# Patient Record
Sex: FEMALE | Race: WHITE | HISPANIC OR LATINO | Employment: UNEMPLOYED | URBAN - METROPOLITAN AREA
[De-identification: names, ages, dates, MRNs, and addresses within clinical notes are randomized per-mention and may not be internally consistent; named-entity substitution may affect disease eponyms.]

---

## 2019-06-05 ENCOUNTER — APPOINTMENT (OUTPATIENT)
Dept: RADIOLOGY | Facility: MEDICAL CENTER | Age: 54
End: 2019-06-05
Attending: EMERGENCY MEDICINE
Payer: COMMERCIAL

## 2019-06-05 ENCOUNTER — HOSPITAL ENCOUNTER (EMERGENCY)
Facility: MEDICAL CENTER | Age: 54
End: 2019-06-06
Attending: EMERGENCY MEDICINE
Payer: COMMERCIAL

## 2019-06-05 DIAGNOSIS — K52.9 COLITIS: ICD-10-CM

## 2019-06-05 DIAGNOSIS — R10.30 LOWER ABDOMINAL PAIN: ICD-10-CM

## 2019-06-05 DIAGNOSIS — R25.2 MUSCLE CRAMPS: ICD-10-CM

## 2019-06-05 LAB
ABO + RH BLD: NORMAL
ABO GROUP BLD: NORMAL
ALBUMIN SERPL BCP-MCNC: 3.8 G/DL (ref 3.2–4.9)
ALBUMIN/GLOB SERPL: 1 G/DL
ALP SERPL-CCNC: 78 U/L (ref 30–99)
ALT SERPL-CCNC: 14 U/L (ref 2–50)
ANION GAP SERPL CALC-SCNC: 8 MMOL/L (ref 0–11.9)
APPEARANCE UR: CLEAR
APTT PPP: 26.7 SEC (ref 24.7–36)
AST SERPL-CCNC: 18 U/L (ref 12–45)
BASOPHILS # BLD AUTO: 0.9 % (ref 0–1.8)
BASOPHILS # BLD: 0.06 K/UL (ref 0–0.12)
BILIRUB SERPL-MCNC: 0.6 MG/DL (ref 0.1–1.5)
BILIRUB UR QL STRIP.AUTO: NEGATIVE
BLD GP AB SCN SERPL QL: NORMAL
BUN SERPL-MCNC: 13 MG/DL (ref 8–22)
CALCIUM SERPL-MCNC: 9.2 MG/DL (ref 8.4–10.2)
CHLORIDE SERPL-SCNC: 102 MMOL/L (ref 96–112)
CO2 SERPL-SCNC: 28 MMOL/L (ref 20–33)
COLOR UR: YELLOW
CREAT SERPL-MCNC: 0.8 MG/DL (ref 0.5–1.4)
EOSINOPHIL # BLD AUTO: 0.22 K/UL (ref 0–0.51)
EOSINOPHIL NFR BLD: 3.3 % (ref 0–6.9)
ERYTHROCYTE [DISTWIDTH] IN BLOOD BY AUTOMATED COUNT: 39.2 FL (ref 35.9–50)
GLOBULIN SER CALC-MCNC: 3.8 G/DL (ref 1.9–3.5)
GLUCOSE SERPL-MCNC: 254 MG/DL (ref 65–99)
GLUCOSE UR STRIP.AUTO-MCNC: 100 MG/DL
HCT VFR BLD AUTO: 37.1 % (ref 37–47)
HGB BLD-MCNC: 12 G/DL (ref 12–16)
IMM GRANULOCYTES # BLD AUTO: 0.01 K/UL (ref 0–0.11)
IMM GRANULOCYTES NFR BLD AUTO: 0.1 % (ref 0–0.9)
INR PPP: 1 (ref 0.87–1.13)
KETONES UR STRIP.AUTO-MCNC: NEGATIVE MG/DL
LEUKOCYTE ESTERASE UR QL STRIP.AUTO: NEGATIVE
LIPASE SERPL-CCNC: 38 U/L (ref 7–58)
LYMPHOCYTES # BLD AUTO: 2.39 K/UL (ref 1–4.8)
LYMPHOCYTES NFR BLD: 35.7 % (ref 22–41)
MCH RBC QN AUTO: 27.8 PG (ref 27–33)
MCHC RBC AUTO-ENTMCNC: 32.3 G/DL (ref 33.6–35)
MCV RBC AUTO: 86.1 FL (ref 81.4–97.8)
MICRO URNS: ABNORMAL
MONOCYTES # BLD AUTO: 0.74 K/UL (ref 0–0.85)
MONOCYTES NFR BLD AUTO: 11 % (ref 0–13.4)
NEUTROPHILS # BLD AUTO: 3.28 K/UL (ref 2–7.15)
NEUTROPHILS NFR BLD: 49 % (ref 44–72)
NITRITE UR QL STRIP.AUTO: NEGATIVE
NRBC # BLD AUTO: 0 K/UL
NRBC BLD-RTO: 0 /100 WBC
PH UR STRIP.AUTO: 7.5 [PH]
PLATELET # BLD AUTO: 279 K/UL (ref 164–446)
PMV BLD AUTO: 10.5 FL (ref 9–12.9)
POTASSIUM SERPL-SCNC: 3.6 MMOL/L (ref 3.6–5.5)
PROT SERPL-MCNC: 7.6 G/DL (ref 6–8.2)
PROT UR QL STRIP: NEGATIVE MG/DL
PROTHROMBIN TIME: 13.3 SEC (ref 12–14.6)
RBC # BLD AUTO: 4.31 M/UL (ref 4.2–5.4)
RBC UR QL AUTO: NEGATIVE
RH BLD: NORMAL
SODIUM SERPL-SCNC: 138 MMOL/L (ref 135–145)
SP GR UR STRIP.AUTO: <=1.005
WBC # BLD AUTO: 6.7 K/UL (ref 4.8–10.8)

## 2019-06-05 PROCEDURE — 81003 URINALYSIS AUTO W/O SCOPE: CPT

## 2019-06-05 PROCEDURE — 700111 HCHG RX REV CODE 636 W/ 250 OVERRIDE (IP): Performed by: EMERGENCY MEDICINE

## 2019-06-05 PROCEDURE — 85730 THROMBOPLASTIN TIME PARTIAL: CPT

## 2019-06-05 PROCEDURE — 85610 PROTHROMBIN TIME: CPT

## 2019-06-05 PROCEDURE — 71045 X-RAY EXAM CHEST 1 VIEW: CPT

## 2019-06-05 PROCEDURE — 700102 HCHG RX REV CODE 250 W/ 637 OVERRIDE(OP): Performed by: EMERGENCY MEDICINE

## 2019-06-05 PROCEDURE — 83690 ASSAY OF LIPASE: CPT

## 2019-06-05 PROCEDURE — 36415 COLL VENOUS BLD VENIPUNCTURE: CPT

## 2019-06-05 PROCEDURE — 82272 OCCULT BLD FECES 1-3 TESTS: CPT

## 2019-06-05 PROCEDURE — 96374 THER/PROPH/DIAG INJ IV PUSH: CPT

## 2019-06-05 PROCEDURE — 86850 RBC ANTIBODY SCREEN: CPT

## 2019-06-05 PROCEDURE — A9270 NON-COVERED ITEM OR SERVICE: HCPCS | Performed by: EMERGENCY MEDICINE

## 2019-06-05 PROCEDURE — 99285 EMERGENCY DEPT VISIT HI MDM: CPT

## 2019-06-05 PROCEDURE — 80053 COMPREHEN METABOLIC PANEL: CPT

## 2019-06-05 PROCEDURE — 700105 HCHG RX REV CODE 258

## 2019-06-05 PROCEDURE — 86900 BLOOD TYPING SEROLOGIC ABO: CPT

## 2019-06-05 PROCEDURE — 86901 BLOOD TYPING SEROLOGIC RH(D): CPT

## 2019-06-05 PROCEDURE — 85025 COMPLETE CBC W/AUTO DIFF WBC: CPT

## 2019-06-05 RX ORDER — SODIUM CHLORIDE 9 MG/ML
1000 INJECTION, SOLUTION INTRAVENOUS ONCE
Status: DISCONTINUED | OUTPATIENT
Start: 2019-06-05 | End: 2019-06-06

## 2019-06-05 RX ORDER — ONDANSETRON 2 MG/ML
4 INJECTION INTRAMUSCULAR; INTRAVENOUS ONCE
Status: DISCONTINUED | OUTPATIENT
Start: 2019-06-05 | End: 2019-06-06 | Stop reason: HOSPADM

## 2019-06-05 RX ORDER — MORPHINE SULFATE 4 MG/ML
4 INJECTION, SOLUTION INTRAMUSCULAR; INTRAVENOUS ONCE
Status: DISCONTINUED | OUTPATIENT
Start: 2019-06-05 | End: 2019-06-06 | Stop reason: HOSPADM

## 2019-06-05 RX ORDER — SODIUM CHLORIDE 9 MG/ML
1000 INJECTION, SOLUTION INTRAVENOUS ONCE
Status: COMPLETED | OUTPATIENT
Start: 2019-06-05 | End: 2019-06-06

## 2019-06-05 RX ORDER — METRONIDAZOLE 500 MG/1
500 TABLET ORAL 3 TIMES DAILY
Status: SHIPPED | COMMUNITY
End: 2019-06-06

## 2019-06-05 RX ORDER — PANTOPRAZOLE SODIUM 40 MG/1
40 TABLET, DELAYED RELEASE ORAL DAILY
Status: SHIPPED | COMMUNITY
End: 2019-08-03

## 2019-06-05 RX ADMIN — FAMOTIDINE 20 MG: 10 INJECTION, SOLUTION INTRAVENOUS at 22:37

## 2019-06-05 RX ADMIN — SODIUM CHLORIDE 1000 ML: 9 INJECTION, SOLUTION INTRAVENOUS at 22:37

## 2019-06-06 VITALS
HEIGHT: 63 IN | HEART RATE: 68 BPM | TEMPERATURE: 98.9 F | SYSTOLIC BLOOD PRESSURE: 170 MMHG | WEIGHT: 164.9 LBS | OXYGEN SATURATION: 94 % | BODY MASS INDEX: 29.22 KG/M2 | RESPIRATION RATE: 18 BRPM | DIASTOLIC BLOOD PRESSURE: 92 MMHG

## 2019-06-06 LAB
ERYTHROCYTE [DISTWIDTH] IN BLOOD BY AUTOMATED COUNT: 39.5 FL (ref 35.9–50)
HCT VFR BLD AUTO: 34 % (ref 37–47)
HGB BLD-MCNC: 11.1 G/DL (ref 12–16)
MCH RBC QN AUTO: 28.5 PG (ref 27–33)
MCHC RBC AUTO-ENTMCNC: 32.6 G/DL (ref 33.6–35)
MCV RBC AUTO: 87.2 FL (ref 81.4–97.8)
PLATELET # BLD AUTO: 248 K/UL (ref 164–446)
PMV BLD AUTO: 10.7 FL (ref 9–12.9)
RBC # BLD AUTO: 3.9 M/UL (ref 4.2–5.4)
WBC # BLD AUTO: 6.3 K/UL (ref 4.8–10.8)

## 2019-06-06 PROCEDURE — 74177 CT ABD & PELVIS W/CONTRAST: CPT

## 2019-06-06 PROCEDURE — 700117 HCHG RX CONTRAST REV CODE 255: Performed by: EMERGENCY MEDICINE

## 2019-06-06 PROCEDURE — A9270 NON-COVERED ITEM OR SERVICE: HCPCS | Performed by: EMERGENCY MEDICINE

## 2019-06-06 PROCEDURE — 700102 HCHG RX REV CODE 250 W/ 637 OVERRIDE(OP): Performed by: EMERGENCY MEDICINE

## 2019-06-06 PROCEDURE — 85027 COMPLETE CBC AUTOMATED: CPT

## 2019-06-06 RX ORDER — METRONIDAZOLE 500 MG/1
500 TABLET ORAL ONCE
Status: COMPLETED | OUTPATIENT
Start: 2019-06-06 | End: 2019-06-06

## 2019-06-06 RX ORDER — CIPROFLOXACIN 500 MG/1
500 TABLET, FILM COATED ORAL ONCE
Status: COMPLETED | OUTPATIENT
Start: 2019-06-06 | End: 2019-06-06

## 2019-06-06 RX ORDER — METRONIDAZOLE 500 MG/1
500 TABLET ORAL 3 TIMES DAILY
Qty: 21 TAB | Refills: 0 | Status: SHIPPED | OUTPATIENT
Start: 2019-06-06 | End: 2019-06-13

## 2019-06-06 RX ORDER — CIPROFLOXACIN 500 MG/1
500 TABLET, FILM COATED ORAL 2 TIMES DAILY
Qty: 14 TAB | Refills: 0 | Status: SHIPPED | OUTPATIENT
Start: 2019-06-06 | End: 2019-06-13

## 2019-06-06 RX ADMIN — IOHEXOL 25 ML: 240 INJECTION, SOLUTION INTRATHECAL; INTRAVASCULAR; INTRAVENOUS; ORAL at 00:33

## 2019-06-06 RX ADMIN — IOHEXOL 100 ML: 350 INJECTION, SOLUTION INTRAVENOUS at 00:33

## 2019-06-06 RX ADMIN — METRONIDAZOLE 500 MG: 500 TABLET ORAL at 01:10

## 2019-06-06 RX ADMIN — CIPROFLOXACIN HYDROCHLORIDE 500 MG: 500 TABLET, FILM COATED ORAL at 01:10

## 2019-06-06 NOTE — ED PROVIDER NOTES
"ED Provider Note    CHIEF COMPLAINT  Chief Complaint   Patient presents with   • Bloody Stools     for several days    • Abdominal Pain     for several days       HPI  Patient is a 53-year-old female with no reported past medical history and no chronic medications who presents emergency room for evaluation of chronic abdominal pains.  She reports having intermittent loose stools and speckled amounts of red blood in the stools with associated abdominal pains for the better part of one month.  She notes that she was treated in clinic and was given 3 medications which include pantoprazole/clarithromycin/Flagyl but is unsure of the diagnosis or why she is taking them.  She completed these medicines but has continued to have 5-6 loose stools per day and is now developing muscle cramps, intermittent fatigue and malaise, and has had no change with her bloody bowel movements or loose stools.  She reports subjective fevers, chills but denies chest pain/shortness of breath/nausea/vomiting, headaches/dizziness.    Video  used for HPI/exam/consent for DAMON.    REVIEW OF SYSTEMS  See HPI for further details. All remaining 6 symptoms are negative    PAST MEDICAL HISTORY       SOCIAL HISTORY  Social History     Social History Main Topics   • Smoking status: Never Smoker   • Smokeless tobacco: Never Used   • Alcohol use No   • Drug use: No   • Sexual activity: Not on file       SURGICAL HISTORY  patient denies any surgical history    CURRENT MEDICATIONS  Home Medications     Reviewed by Flori Larkin R.N. (Registered Nurse) on 06/05/19 at 2112  Med List Status: Partial   Medication Last Dose Status   metroNIDAZOLE (FLAGYL) 500 MG Tab  Active   pantoprazole (PROTONIX) 40 MG Tablet Delayed Response  Active                ALLERGIES  No Known Allergies    PHYSICAL EXAM  VITAL SIGNS: BP (!) 170/92   Pulse 68   Temp 37.2 °C (98.9 °F) (Temporal)   Resp 18   Ht 1.6 m (5' 3\")   Wt 74.8 kg (164 lb 14.5 oz)   SpO2 94%   " BMI 29.21 kg/m²   Pulse ox interpretation: I interpret this pulse ox as normal.  Genl: F sitting in chair comfortably, speaking clearly, appears in mild distress   Head: NC/AT   ENT: Mucous membranes dry, posterior pharynx clear, uvula midline, nares patent bilaterally   Eyes: Normal sclera, pupils equal round reactive to light  Neck: Supple, FROM, no LAD appreciated   Pulmonary: Lungs are clear to auscultation bilaterally  Chest: No TTP  CV:  RRR, no murmur appreciated, pulses 2+ in both upper and lower extremities  Abdomen: obese and soft, Diffusely tender without localizing areas.  ND; no rebound/guarding, no masses palpated, no HSM  : no CVA tenderness.  Rectal: no fullness or signs of abscess or appreciable tenderness.  No gross blood on DAMON.  Guiac is weakly positive  Musculoskeletal: Pain free ROM of the neck. Moving upper and lower extremities and spontaneous in coordinated fashion  Neuro: A&Ox4 (person, place, time, situation), speech fluent, gait steady, no focal deficits appreciated, No cerebellar signs. Sensation is grossly intact in the distal upper and lower extremities.  5/5 strength in  and dorsiflexion/plantar flexion of the ankles  Psych: Patient has an appropriate affect and behavior  Skin: No rash or lesions.  No pallor or jaundice.  No cyanosis.  Warm and dry.     DIAGNOSTIC STUDIES / PROCEDURES    LABS  Labs Reviewed   CBC WITH DIFFERENTIAL - Abnormal; Notable for the following:        Result Value    MCHC 32.3 (*)     All other components within normal limits    Narrative:     Indicate which anticoagulants the patient is on:->NONE   COMP METABOLIC PANEL - Abnormal; Notable for the following:     Glucose 254 (*)     Globulin 3.8 (*)     All other components within normal limits    Narrative:     Indicate which anticoagulants the patient is on:->NONE   URINALYSIS,CULTURE IF INDICATED - Abnormal; Notable for the following:     Glucose 100 (*)     All other components within normal limits    CBC WITHOUT DIFFERENTIAL - Abnormal; Notable for the following:     RBC 3.90 (*)     Hemoglobin 11.1 (*)     Hematocrit 34.0 (*)     MCHC 32.6 (*)     All other components within normal limits   COD (ADULT)   LIPASE    Narrative:     Indicate which anticoagulants the patient is on:->NONE   PROTHROMBIN TIME    Narrative:     Indicate which anticoagulants the patient is on:->NONE   APTT    Narrative:     Indicate which anticoagulants the patient is on:->NONE   ABO RH CONFIRM   ESTIMATED GFR    Narrative:     Indicate which anticoagulants the patient is on:->NONE     RADIOLOGY  CT-ABDOMEN-PELVIS WITH   Final Result            1. Study degraded by motion artifact.   2. Rectosigmoid wall thickening suggesting infectious or inflammatory colitis.               DX-CHEST-PORTABLE (1 VIEW)   Final Result      No acute cardiopulmonary abnormality.          COURSE & MEDICAL DECISION MAKING  Pertinent Labs & Imaging studies reviewed. (See chart for details)    DDX:  Diveriticulitis  Medication side effect  Rectal fissure  Rectal mass  Malignancy  Ovarian torsion/pregancy are less likely  Cholecystitis  Appendicitis  Neprolithiasis  UTI/Cystitis  Gastroenteritis    MDM    Initial evaluation at [facility]:    Patient presented emergency room for the symptoms as described above.  She was having lower abdominal discomfort, continued loose stools and upper myalgias of the lower extremities.  On initial evaluation she had diffuse abdominal tenderness without signs of progression to peritonitis.  Based on the duration of her symptoms, associated bloody stools and frequent stooling, I was concerned mostly about diverticulitis versus colitis and other infectious etiology.  She was afebrile with reassuring vital signs but had clinical signs of dehydration on exam.  IV fluid bolus initiated and labs drawn for the differential noted above.  There is no leukocytosis, no leftward shift, slight anemia.  There is no LAURA or gross metabolic  abnormalities.  The patient is offered GI cocktail, nausea medicines and pain medications which she politely refused.  Review of her prior medications notes that this is likely a treatment for upper GI infection or possible H. pylori.  It does not sound that she had a full work-up for urease breath test or any other acute diagnostic interventions on prior work-up.  Based on her mostly lower abdominal discomfort CT abdomen pelvis is obtained.  Urinalysis is without acute infectious etiology and she has no diarrhea here in the emergency department.  Stool guaiac from DAMON is only weakly positive with no heidi blood.  CT of the abdomen and pelvis showed evidence of rectosigmoid colitis.  She is treated with Flagyl and ciprofloxacin.  Repeat exam shows no progression in her abdominal exam and no evidence of vital sign abnormalities or development of fevers.  With no other signs of acute surgical process patient will be started on a outpatient course of appropriate antibiotics for acute colitis.  Patient is given strict return precautions and understands that there may be associated diarrhea or other loose stools that are a side effect of these medications and she is at increased risk of colitis from C. difficile.  If she has worsening fevers, chills or abdominal distention she needs to return for reevaluation and stool culture.  She should follow-up with her outpatient provider upon discharge and completion of the antibiotics.  Questions are addressed using  services and she is discharged home in stable condition    HYDRATION: Based on the patient's presentation of Acute Diarrhea and Dehydration the patient was given IV fluids. IV Hydration was used because oral hydration was not adequate alone. Upon recheck following hydration, the patient was improved.    The patient will not drink alcohol nor drive with prescribed medications. The patient will return for worsening symptoms and is stable at the time of  discharge. The patient verbalizes understanding and will comply.      FINAL IMPRESSION  Visit Diagnoses     ICD-10-CM   1. Colitis K52.9   2. Lower abdominal pain R10.30   3. Muscle cramps R25.2      Electronically signed by: Corey Tidwell, 6/5/2019 10:04 PM

## 2019-06-06 NOTE — ED NOTES
Pt in bed with  at bedside, c/o diarrhea x 4 weeks and blood in stool for last 2 days. Pt c/o continued abdominal pain for past 4 weeks. IV placed and blood samples at lab. ERP just left bedside. Pt ambulating to bathroom to provide urine sample. Pt primarily Italian speaking language line  used.

## 2019-06-06 NOTE — ED NOTES
Pt medicated per MAR. Pr refused need for morphine and zofran. GI cocktail held at this time, pt provided contrast to drink for CT and instructed on how to drink.

## 2019-06-06 NOTE — ED TRIAGE NOTES
Pt BIB   C/o abdomen pain and diarrhea for over a month now  Diarrhea is bloody and abdomen is very painful  Was seen by Dr Leticia Thomas and ws started on med's  Just finished Biaxin and is still on flagyl  Feels not getting better having body aches all over

## 2019-06-06 NOTE — ED NOTES
ERP was in to discuss plan of care with pt and . Patient provided printed discharge instructions which included signs and symptoms to look out for, why to return to ER, and other follow up appointment to make. Patient provided prescriptions, information on medications, and how to . Patient stated they understand discharge instructions and had no further questions or concerns at this time. Patient discharged to home with . Patient ambulated out of ER with stable gait.

## 2019-08-03 ENCOUNTER — HOSPITAL ENCOUNTER (INPATIENT)
Facility: MEDICAL CENTER | Age: 54
LOS: 3 days | DRG: 386 | End: 2019-08-06
Attending: EMERGENCY MEDICINE | Admitting: HOSPITALIST
Payer: COMMERCIAL

## 2019-08-03 ENCOUNTER — APPOINTMENT (OUTPATIENT)
Dept: RADIOLOGY | Facility: MEDICAL CENTER | Age: 54
DRG: 386 | End: 2019-08-03
Attending: EMERGENCY MEDICINE
Payer: COMMERCIAL

## 2019-08-03 DIAGNOSIS — K51.00 ULCERATIVE PANCOLITIS WITHOUT COMPLICATION (HCC): ICD-10-CM

## 2019-08-03 PROBLEM — E87.6 HYPOKALEMIA: Status: ACTIVE | Noted: 2019-08-03

## 2019-08-03 PROBLEM — E87.1 HYPONATREMIA: Status: ACTIVE | Noted: 2019-08-03

## 2019-08-03 PROBLEM — R73.9 HYPERGLYCEMIA: Status: ACTIVE | Noted: 2019-08-03

## 2019-08-03 PROBLEM — K62.5 BRBPR (BRIGHT RED BLOOD PER RECTUM): Status: ACTIVE | Noted: 2019-08-03

## 2019-08-03 LAB
ALBUMIN SERPL BCP-MCNC: 2.6 G/DL (ref 3.2–4.9)
ALBUMIN/GLOB SERPL: 0.6 G/DL
ALP SERPL-CCNC: 78 U/L (ref 30–99)
ALT SERPL-CCNC: 27 U/L (ref 2–50)
ANION GAP SERPL CALC-SCNC: 9 MMOL/L (ref 0–11.9)
APPEARANCE UR: CLEAR
AST SERPL-CCNC: 24 U/L (ref 12–45)
BACTERIA #/AREA URNS HPF: ABNORMAL /HPF
BASOPHILS # BLD AUTO: 0.5 % (ref 0–1.8)
BASOPHILS # BLD: 0.05 K/UL (ref 0–0.12)
BILIRUB SERPL-MCNC: 0.4 MG/DL (ref 0.1–1.5)
BILIRUB UR QL STRIP.AUTO: NORMAL
BUN SERPL-MCNC: 7 MG/DL (ref 8–22)
CALCIUM SERPL-MCNC: 8.1 MG/DL (ref 8.4–10.2)
CHLORIDE SERPL-SCNC: 95 MMOL/L (ref 96–112)
CO2 SERPL-SCNC: 25 MMOL/L (ref 20–33)
COLOR UR: NORMAL
CREAT SERPL-MCNC: 0.62 MG/DL (ref 0.5–1.4)
EKG IMPRESSION: NORMAL
EOSINOPHIL # BLD AUTO: 0.1 K/UL (ref 0–0.51)
EOSINOPHIL NFR BLD: 1.1 % (ref 0–6.9)
EPI CELLS #/AREA URNS HPF: ABNORMAL /HPF
ERYTHROCYTE [DISTWIDTH] IN BLOOD BY AUTOMATED COUNT: 38.4 FL (ref 35.9–50)
GLOBULIN SER CALC-MCNC: 4.1 G/DL (ref 1.9–3.5)
GLUCOSE SERPL-MCNC: 267 MG/DL (ref 65–99)
GLUCOSE UR STRIP.AUTO-MCNC: NORMAL MG/DL
HCT VFR BLD AUTO: 32.4 % (ref 37–47)
HGB BLD-MCNC: 10.7 G/DL (ref 12–16)
IMM GRANULOCYTES # BLD AUTO: 0.05 K/UL (ref 0–0.11)
IMM GRANULOCYTES NFR BLD AUTO: 0.5 % (ref 0–0.9)
KETONES UR STRIP.AUTO-MCNC: NORMAL MG/DL
LACTATE BLD-SCNC: 1.3 MMOL/L (ref 0.5–2)
LACTATE BLD-SCNC: 1.4 MMOL/L (ref 0.5–2)
LEUKOCYTE ESTERASE UR QL STRIP.AUTO: NEGATIVE
LIPASE SERPL-CCNC: 27 U/L (ref 7–58)
LYMPHOCYTES # BLD AUTO: 1.22 K/UL (ref 1–4.8)
LYMPHOCYTES NFR BLD: 12.9 % (ref 22–41)
MCH RBC QN AUTO: 27.2 PG (ref 27–33)
MCHC RBC AUTO-ENTMCNC: 33 G/DL (ref 33.6–35)
MCV RBC AUTO: 82.2 FL (ref 81.4–97.8)
MICRO URNS: NORMAL
MONOCYTES # BLD AUTO: 0.64 K/UL (ref 0–0.85)
MONOCYTES NFR BLD AUTO: 6.8 % (ref 0–13.4)
MUCOUS THREADS #/AREA URNS HPF: ABNORMAL /HPF
NEUTROPHILS # BLD AUTO: 7.42 K/UL (ref 2–7.15)
NEUTROPHILS NFR BLD: 78.2 % (ref 44–72)
NITRITE UR QL STRIP.AUTO: NEGATIVE
NRBC # BLD AUTO: 0 K/UL
NRBC BLD-RTO: 0 /100 WBC
PH UR STRIP.AUTO: 6.5 [PH] (ref 5–8)
PLATELET # BLD AUTO: 310 K/UL (ref 164–446)
PMV BLD AUTO: 9.5 FL (ref 9–12.9)
POTASSIUM SERPL-SCNC: 3.3 MMOL/L (ref 3.6–5.5)
PROT SERPL-MCNC: 6.7 G/DL (ref 6–8.2)
PROT UR QL STRIP: NORMAL MG/DL
RBC # BLD AUTO: 3.94 M/UL (ref 4.2–5.4)
RBC # URNS HPF: ABNORMAL /HPF
RBC UR QL AUTO: NEGATIVE
SODIUM SERPL-SCNC: 129 MMOL/L (ref 135–145)
SP GR UR STRIP.AUTO: 1.02
TROPONIN T SERPL-MCNC: 28 NG/L (ref 6–19)
WBC # BLD AUTO: 9.5 K/UL (ref 4.8–10.8)
WBC #/AREA URNS HPF: ABNORMAL /HPF

## 2019-08-03 PROCEDURE — 71045 X-RAY EXAM CHEST 1 VIEW: CPT

## 2019-08-03 PROCEDURE — 94760 N-INVAS EAR/PLS OXIMETRY 1: CPT

## 2019-08-03 PROCEDURE — 87040 BLOOD CULTURE FOR BACTERIA: CPT

## 2019-08-03 PROCEDURE — 83690 ASSAY OF LIPASE: CPT

## 2019-08-03 PROCEDURE — 84484 ASSAY OF TROPONIN QUANT: CPT

## 2019-08-03 PROCEDURE — 700111 HCHG RX REV CODE 636 W/ 250 OVERRIDE (IP): Performed by: EMERGENCY MEDICINE

## 2019-08-03 PROCEDURE — 85025 COMPLETE CBC W/AUTO DIFF WBC: CPT

## 2019-08-03 PROCEDURE — 80053 COMPREHEN METABOLIC PANEL: CPT

## 2019-08-03 PROCEDURE — 770020 HCHG ROOM/CARE - TELE (206)

## 2019-08-03 PROCEDURE — 700105 HCHG RX REV CODE 258: Performed by: HOSPITALIST

## 2019-08-03 PROCEDURE — 96375 TX/PRO/DX INJ NEW DRUG ADDON: CPT

## 2019-08-03 PROCEDURE — 700102 HCHG RX REV CODE 250 W/ 637 OVERRIDE(OP)

## 2019-08-03 PROCEDURE — 99222 1ST HOSP IP/OBS MODERATE 55: CPT | Performed by: HOSPITALIST

## 2019-08-03 PROCEDURE — 700111 HCHG RX REV CODE 636 W/ 250 OVERRIDE (IP): Performed by: HOSPITALIST

## 2019-08-03 PROCEDURE — A9270 NON-COVERED ITEM OR SERVICE: HCPCS | Performed by: FAMILY MEDICINE

## 2019-08-03 PROCEDURE — 93005 ELECTROCARDIOGRAM TRACING: CPT | Performed by: EMERGENCY MEDICINE

## 2019-08-03 PROCEDURE — 99285 EMERGENCY DEPT VISIT HI MDM: CPT

## 2019-08-03 PROCEDURE — A9270 NON-COVERED ITEM OR SERVICE: HCPCS

## 2019-08-03 PROCEDURE — 96374 THER/PROPH/DIAG INJ IV PUSH: CPT

## 2019-08-03 PROCEDURE — 83605 ASSAY OF LACTIC ACID: CPT

## 2019-08-03 PROCEDURE — 700101 HCHG RX REV CODE 250: Performed by: HOSPITALIST

## 2019-08-03 PROCEDURE — 700101 HCHG RX REV CODE 250: Performed by: FAMILY MEDICINE

## 2019-08-03 PROCEDURE — 81001 URINALYSIS AUTO W/SCOPE: CPT

## 2019-08-03 PROCEDURE — 700102 HCHG RX REV CODE 250 W/ 637 OVERRIDE(OP): Performed by: FAMILY MEDICINE

## 2019-08-03 PROCEDURE — 700105 HCHG RX REV CODE 258: Performed by: EMERGENCY MEDICINE

## 2019-08-03 PROCEDURE — 36415 COLL VENOUS BLD VENIPUNCTURE: CPT

## 2019-08-03 RX ORDER — SULFASALAZINE 500 MG/1
1000 TABLET ORAL 3 TIMES DAILY
Status: DISCONTINUED | OUTPATIENT
Start: 2019-08-03 | End: 2019-08-06 | Stop reason: HOSPADM

## 2019-08-03 RX ORDER — SULFASALAZINE 500 MG/1
1000 TABLET ORAL 3 TIMES DAILY
COMMUNITY

## 2019-08-03 RX ORDER — ACETAMINOPHEN 500 MG
1000 TABLET ORAL EVERY 8 HOURS PRN
COMMUNITY

## 2019-08-03 RX ORDER — AMOXICILLIN 250 MG
2 CAPSULE ORAL 2 TIMES DAILY
Status: DISCONTINUED | OUTPATIENT
Start: 2019-08-03 | End: 2019-08-03 | Stop reason: SINTOL

## 2019-08-03 RX ORDER — MORPHINE SULFATE 4 MG/ML
4 INJECTION, SOLUTION INTRAMUSCULAR; INTRAVENOUS ONCE
Status: COMPLETED | OUTPATIENT
Start: 2019-08-03 | End: 2019-08-03

## 2019-08-03 RX ORDER — PREDNISONE 10 MG/1
5-40 TABLET ORAL SEE ADMIN INSTRUCTIONS
COMMUNITY
Start: 2019-07-31

## 2019-08-03 RX ORDER — ONDANSETRON 2 MG/ML
4 INJECTION INTRAMUSCULAR; INTRAVENOUS ONCE
Status: COMPLETED | OUTPATIENT
Start: 2019-08-03 | End: 2019-08-03

## 2019-08-03 RX ORDER — LIDOCAINE 50 MG/G
1 PATCH TOPICAL
Status: DISCONTINUED | OUTPATIENT
Start: 2019-08-03 | End: 2019-08-06 | Stop reason: HOSPADM

## 2019-08-03 RX ORDER — TRAMADOL HYDROCHLORIDE 50 MG/1
50 TABLET ORAL EVERY 6 HOURS PRN
Status: DISCONTINUED | OUTPATIENT
Start: 2019-08-03 | End: 2019-08-06 | Stop reason: HOSPADM

## 2019-08-03 RX ORDER — SODIUM CHLORIDE 9 MG/ML
500 INJECTION, SOLUTION INTRAVENOUS
Status: DISCONTINUED | OUTPATIENT
Start: 2019-08-03 | End: 2019-08-06 | Stop reason: HOSPADM

## 2019-08-03 RX ORDER — SULFASALAZINE 500 MG/1
TABLET ORAL
Status: COMPLETED
Start: 2019-08-03 | End: 2019-08-03

## 2019-08-03 RX ORDER — BISACODYL 10 MG
10 SUPPOSITORY, RECTAL RECTAL
Status: DISCONTINUED | OUTPATIENT
Start: 2019-08-03 | End: 2019-08-03 | Stop reason: SINTOL

## 2019-08-03 RX ORDER — POLYETHYLENE GLYCOL 3350 17 G/17G
1 POWDER, FOR SOLUTION ORAL
Status: DISCONTINUED | OUTPATIENT
Start: 2019-08-03 | End: 2019-08-03 | Stop reason: SINTOL

## 2019-08-03 RX ORDER — SODIUM CHLORIDE 9 MG/ML
1000 INJECTION, SOLUTION INTRAVENOUS ONCE
Status: COMPLETED | OUTPATIENT
Start: 2019-08-03 | End: 2019-08-03

## 2019-08-03 RX ORDER — SODIUM CHLORIDE 9 MG/ML
30 INJECTION, SOLUTION INTRAVENOUS
Status: DISCONTINUED | OUTPATIENT
Start: 2019-08-03 | End: 2019-08-06 | Stop reason: HOSPADM

## 2019-08-03 RX ORDER — SODIUM CHLORIDE 9 MG/ML
INJECTION, SOLUTION INTRAVENOUS CONTINUOUS
Status: DISCONTINUED | OUTPATIENT
Start: 2019-08-03 | End: 2019-08-06 | Stop reason: HOSPADM

## 2019-08-03 RX ORDER — METHYLPREDNISOLONE SODIUM SUCCINATE 125 MG/2ML
62.5 INJECTION, POWDER, LYOPHILIZED, FOR SOLUTION INTRAMUSCULAR; INTRAVENOUS EVERY 6 HOURS
Status: DISCONTINUED | OUTPATIENT
Start: 2019-08-03 | End: 2019-08-05

## 2019-08-03 RX ADMIN — SULFASALAZINE 1000 MG: 500 TABLET ORAL at 22:02

## 2019-08-03 RX ADMIN — MORPHINE SULFATE 4 MG: 4 INJECTION INTRAVENOUS at 16:55

## 2019-08-03 RX ADMIN — LIDOCAINE 1 PATCH: 50 PATCH TOPICAL at 22:51

## 2019-08-03 RX ADMIN — ONDANSETRON 4 MG: 2 INJECTION INTRAMUSCULAR; INTRAVENOUS at 16:55

## 2019-08-03 RX ADMIN — TRAMADOL HYDROCHLORIDE 50 MG: 50 TABLET, FILM COATED ORAL at 22:51

## 2019-08-03 RX ADMIN — METHYLPREDNISOLONE SODIUM SUCCINATE 62.5 MG: 125 INJECTION, POWDER, FOR SOLUTION INTRAMUSCULAR; INTRAVENOUS at 22:02

## 2019-08-03 RX ADMIN — METRONIDAZOLE 500 MG: 500 INJECTION, SOLUTION INTRAVENOUS at 22:51

## 2019-08-03 RX ADMIN — CEFTRIAXONE SODIUM 2 G: 2 INJECTION, POWDER, FOR SOLUTION INTRAMUSCULAR; INTRAVENOUS at 22:03

## 2019-08-03 RX ADMIN — SODIUM CHLORIDE 1000 ML: 9 INJECTION, SOLUTION INTRAVENOUS at 18:20

## 2019-08-03 RX ADMIN — SODIUM CHLORIDE: 9 INJECTION, SOLUTION INTRAVENOUS at 22:03

## 2019-08-03 SDOH — HEALTH STABILITY: MENTAL HEALTH: HOW OFTEN DO YOU HAVE 6 OR MORE DRINKS ON ONE OCCASION?: NEVER

## 2019-08-03 SDOH — HEALTH STABILITY: MENTAL HEALTH: HOW MANY STANDARD DRINKS CONTAINING ALCOHOL DO YOU HAVE ON A TYPICAL DAY?: PATIENT DECLINED

## 2019-08-03 SDOH — HEALTH STABILITY: MENTAL HEALTH: HOW OFTEN DO YOU HAVE A DRINK CONTAINING ALCOHOL?: NEVER

## 2019-08-03 ASSESSMENT — COGNITIVE AND FUNCTIONAL STATUS - GENERAL
MOBILITY SCORE: 22
STANDING UP FROM CHAIR USING ARMS: A LITTLE
SUGGESTED CMS G CODE MODIFIER MOBILITY: CJ
DAILY ACTIVITIY SCORE: 24
SUGGESTED CMS G CODE MODIFIER DAILY ACTIVITY: CH
WALKING IN HOSPITAL ROOM: A LITTLE

## 2019-08-03 ASSESSMENT — LIFESTYLE VARIABLES
EVER FELT BAD OR GUILTY ABOUT YOUR DRINKING: NO
TOTAL SCORE: 0
CONSUMPTION TOTAL: NEGATIVE
HAVE YOU EVER FELT YOU SHOULD CUT DOWN ON YOUR DRINKING: NO
HOW MANY TIMES IN THE PAST YEAR HAVE YOU HAD 5 OR MORE DRINKS IN A DAY: 0
EVER_SMOKED: NEVER
ALCOHOL_USE: NO
TOTAL SCORE: 0
AVERAGE NUMBER OF DAYS PER WEEK YOU HAVE A DRINK CONTAINING ALCOHOL: 0
ON A TYPICAL DAY WHEN YOU DRINK ALCOHOL HOW MANY DRINKS DO YOU HAVE: 0
HAVE PEOPLE ANNOYED YOU BY CRITICIZING YOUR DRINKING: NO
EVER HAD A DRINK FIRST THING IN THE MORNING TO STEADY YOUR NERVES TO GET RID OF A HANGOVER: NO
TOTAL SCORE: 0

## 2019-08-03 ASSESSMENT — ENCOUNTER SYMPTOMS
HEARTBURN: 0
WEAKNESS: 1
HEADACHES: 0
NECK PAIN: 0
SORE THROAT: 0
COUGH: 0
SENSORY CHANGE: 0
PHOTOPHOBIA: 0
DIARRHEA: 1
SPEECH CHANGE: 0
BACK PAIN: 0
ABDOMINAL PAIN: 1
STRIDOR: 0
CHILLS: 0
MYALGIAS: 0
FEVER: 1
DEPRESSION: 0
NERVOUS/ANXIOUS: 0
PND: 0
NAUSEA: 1
HEMOPTYSIS: 0
TINGLING: 0
DOUBLE VISION: 0
CLAUDICATION: 0
DIZZINESS: 0
PALPITATIONS: 0
EYE PAIN: 0
SPUTUM PRODUCTION: 0
CONSTIPATION: 0
WEIGHT LOSS: 1
BLOOD IN STOOL: 1
VOMITING: 1
TREMORS: 0
SHORTNESS OF BREATH: 0
ORTHOPNEA: 0
MEMORY LOSS: 0
BLURRED VISION: 0

## 2019-08-03 ASSESSMENT — PATIENT HEALTH QUESTIONNAIRE - PHQ9
SUM OF ALL RESPONSES TO PHQ9 QUESTIONS 1 AND 2: 0
1. LITTLE INTEREST OR PLEASURE IN DOING THINGS: NOT AT ALL
2. FEELING DOWN, DEPRESSED, IRRITABLE, OR HOPELESS: NOT AT ALL

## 2019-08-03 NOTE — ED NOTES
Med Rec completed per patient's family and RX bottles (returned)   Allergies reviewed  No ORAL antibiotics in last 14 days    Pt started a tapering course of Prednisone 7/31/19

## 2019-08-03 NOTE — ED TRIAGE NOTES
"Chief Complaint   Patient presents with   • Weakness     diarrhea for past 4 months - pt sees specialist for issue, family states that pt has not gotten out of be since yesterday or eaten.      /54   Pulse 88   Temp 37.8 °C (100.1 °F) (Temporal)   Resp 18   Ht 1.626 m (5' 4\")   Wt 68 kg (150 lb)   SpO2 97%   BMI 25.75 kg/m²     Pt BIB family for above concerns, pt family states she is also nauseous. Pt given w/c in triage.   "

## 2019-08-04 PROBLEM — K51.90 ULCERATIVE COLITIS, ACUTE (HCC): Status: ACTIVE | Noted: 2019-08-03

## 2019-08-04 LAB
ALBUMIN SERPL BCP-MCNC: 2.3 G/DL (ref 3.2–4.9)
ALBUMIN/GLOB SERPL: 0.6 G/DL
ALP SERPL-CCNC: 72 U/L (ref 30–99)
ALT SERPL-CCNC: 27 U/L (ref 2–50)
ANION GAP SERPL CALC-SCNC: 8 MMOL/L (ref 0–11.9)
AST SERPL-CCNC: 20 U/L (ref 12–45)
BASOPHILS # BLD AUTO: 0.2 % (ref 0–1.8)
BASOPHILS # BLD: 0.02 K/UL (ref 0–0.12)
BILIRUB SERPL-MCNC: 0.6 MG/DL (ref 0.1–1.5)
BUN SERPL-MCNC: <5 MG/DL (ref 8–22)
CALCIUM SERPL-MCNC: 8 MG/DL (ref 8.4–10.2)
CHLORIDE SERPL-SCNC: 100 MMOL/L (ref 96–112)
CO2 SERPL-SCNC: 24 MMOL/L (ref 20–33)
CREAT SERPL-MCNC: 0.56 MG/DL (ref 0.5–1.4)
EOSINOPHIL # BLD AUTO: 0.02 K/UL (ref 0–0.51)
EOSINOPHIL NFR BLD: 0.2 % (ref 0–6.9)
ERYTHROCYTE [DISTWIDTH] IN BLOOD BY AUTOMATED COUNT: 39.6 FL (ref 35.9–50)
GLOBULIN SER CALC-MCNC: 3.8 G/DL (ref 1.9–3.5)
GLUCOSE BLD-MCNC: 255 MG/DL (ref 65–99)
GLUCOSE SERPL-MCNC: 270 MG/DL (ref 65–99)
HCT VFR BLD AUTO: 30.7 % (ref 37–47)
HGB BLD-MCNC: 9.9 G/DL (ref 12–16)
IMM GRANULOCYTES # BLD AUTO: 0.06 K/UL (ref 0–0.11)
IMM GRANULOCYTES NFR BLD AUTO: 0.7 % (ref 0–0.9)
LACTATE BLD-SCNC: 0.8 MMOL/L (ref 0.5–2)
LACTATE BLD-SCNC: 1 MMOL/L (ref 0.5–2)
LYMPHOCYTES # BLD AUTO: 0.89 K/UL (ref 1–4.8)
LYMPHOCYTES NFR BLD: 10.8 % (ref 22–41)
MCH RBC QN AUTO: 27.5 PG (ref 27–33)
MCHC RBC AUTO-ENTMCNC: 32.2 G/DL (ref 33.6–35)
MCV RBC AUTO: 85.3 FL (ref 81.4–97.8)
MONOCYTES # BLD AUTO: 0.18 K/UL (ref 0–0.85)
MONOCYTES NFR BLD AUTO: 2.2 % (ref 0–13.4)
NEUTROPHILS # BLD AUTO: 7.07 K/UL (ref 2–7.15)
NEUTROPHILS NFR BLD: 85.9 % (ref 44–72)
NRBC # BLD AUTO: 0 K/UL
NRBC BLD-RTO: 0 /100 WBC
PLATELET # BLD AUTO: 291 K/UL (ref 164–446)
PMV BLD AUTO: 9.8 FL (ref 9–12.9)
POTASSIUM SERPL-SCNC: 3.7 MMOL/L (ref 3.6–5.5)
PROT SERPL-MCNC: 6.1 G/DL (ref 6–8.2)
RBC # BLD AUTO: 3.6 M/UL (ref 4.2–5.4)
SODIUM SERPL-SCNC: 132 MMOL/L (ref 135–145)
WBC # BLD AUTO: 8.2 K/UL (ref 4.8–10.8)

## 2019-08-04 PROCEDURE — 80053 COMPREHEN METABOLIC PANEL: CPT

## 2019-08-04 PROCEDURE — 700102 HCHG RX REV CODE 250 W/ 637 OVERRIDE(OP): Performed by: HOSPITALIST

## 2019-08-04 PROCEDURE — 85025 COMPLETE CBC W/AUTO DIFF WBC: CPT

## 2019-08-04 PROCEDURE — 700101 HCHG RX REV CODE 250: Performed by: HOSPITALIST

## 2019-08-04 PROCEDURE — 99233 SBSQ HOSP IP/OBS HIGH 50: CPT | Performed by: INTERNAL MEDICINE

## 2019-08-04 PROCEDURE — 700102 HCHG RX REV CODE 250 W/ 637 OVERRIDE(OP): Performed by: INTERNAL MEDICINE

## 2019-08-04 PROCEDURE — 83605 ASSAY OF LACTIC ACID: CPT

## 2019-08-04 PROCEDURE — A9270 NON-COVERED ITEM OR SERVICE: HCPCS | Performed by: HOSPITALIST

## 2019-08-04 PROCEDURE — 770020 HCHG ROOM/CARE - TELE (206)

## 2019-08-04 PROCEDURE — 82962 GLUCOSE BLOOD TEST: CPT

## 2019-08-04 PROCEDURE — 700111 HCHG RX REV CODE 636 W/ 250 OVERRIDE (IP): Performed by: HOSPITALIST

## 2019-08-04 PROCEDURE — 700101 HCHG RX REV CODE 250: Performed by: FAMILY MEDICINE

## 2019-08-04 PROCEDURE — 83036 HEMOGLOBIN GLYCOSYLATED A1C: CPT

## 2019-08-04 PROCEDURE — 700105 HCHG RX REV CODE 258: Performed by: HOSPITALIST

## 2019-08-04 RX ORDER — INSULIN GLARGINE 100 [IU]/ML
5 INJECTION, SOLUTION SUBCUTANEOUS
Status: DISCONTINUED | OUTPATIENT
Start: 2019-08-05 | End: 2019-08-05

## 2019-08-04 RX ORDER — INSULIN GLARGINE 100 [IU]/ML
5 INJECTION, SOLUTION SUBCUTANEOUS
Status: DISCONTINUED | OUTPATIENT
Start: 2019-08-04 | End: 2019-08-04

## 2019-08-04 RX ADMIN — SULFASALAZINE 1000 MG: 500 TABLET ORAL at 18:03

## 2019-08-04 RX ADMIN — METHYLPREDNISOLONE SODIUM SUCCINATE 62.5 MG: 125 INJECTION, POWDER, FOR SOLUTION INTRAMUSCULAR; INTRAVENOUS at 05:45

## 2019-08-04 RX ADMIN — SODIUM CHLORIDE: 9 INJECTION, SOLUTION INTRAVENOUS at 20:26

## 2019-08-04 RX ADMIN — CEFTRIAXONE SODIUM 2 G: 2 INJECTION, POWDER, FOR SOLUTION INTRAMUSCULAR; INTRAVENOUS at 18:07

## 2019-08-04 RX ADMIN — INSULIN HUMAN 3 UNITS: 100 INJECTION, SOLUTION PARENTERAL at 18:16

## 2019-08-04 RX ADMIN — LIDOCAINE 1 PATCH: 50 PATCH TOPICAL at 20:30

## 2019-08-04 RX ADMIN — METRONIDAZOLE 500 MG: 500 INJECTION, SOLUTION INTRAVENOUS at 05:45

## 2019-08-04 RX ADMIN — METRONIDAZOLE 500 MG: 500 INJECTION, SOLUTION INTRAVENOUS at 22:10

## 2019-08-04 RX ADMIN — METHYLPREDNISOLONE SODIUM SUCCINATE 62.5 MG: 125 INJECTION, POWDER, FOR SOLUTION INTRAMUSCULAR; INTRAVENOUS at 11:25

## 2019-08-04 RX ADMIN — SULFASALAZINE 1000 MG: 500 TABLET ORAL at 08:15

## 2019-08-04 RX ADMIN — SULFASALAZINE 1000 MG: 500 TABLET ORAL at 11:24

## 2019-08-04 RX ADMIN — METHYLPREDNISOLONE SODIUM SUCCINATE 62.5 MG: 125 INJECTION, POWDER, FOR SOLUTION INTRAMUSCULAR; INTRAVENOUS at 18:04

## 2019-08-04 RX ADMIN — SODIUM CHLORIDE: 9 INJECTION, SOLUTION INTRAVENOUS at 05:46

## 2019-08-04 RX ADMIN — METRONIDAZOLE 500 MG: 500 INJECTION, SOLUTION INTRAVENOUS at 13:24

## 2019-08-04 ASSESSMENT — ENCOUNTER SYMPTOMS
BACK PAIN: 0
SORE THROAT: 0
WEAKNESS: 0
NAUSEA: 0
DIARRHEA: 1
VOMITING: 0
MYALGIAS: 0
PALPITATIONS: 0
BLOOD IN STOOL: 1
HALLUCINATIONS: 0
SHORTNESS OF BREATH: 0
DIZZINESS: 0
CHILLS: 0
ABDOMINAL PAIN: 1
FOCAL WEAKNESS: 0
COUGH: 0
FEVER: 0
HEARTBURN: 0
DEPRESSION: 0
HEADACHES: 0

## 2019-08-04 NOTE — PROGRESS NOTES
Telemetry Shift Summary    Rhythm SR  HR Range 83-99  Ectopy none  Measurements .16/.08/.32          Normal Values  Rhythm SR  HR Range    Measurements 0.12-0.20 / 0.06-0.10  / 0.30-0.52

## 2019-08-04 NOTE — CARE PLAN
Pt is aware of POC. Progressing as expected in the following areas  Problem: Safety  Goal: Will remain free from injury  Outcome: PROGRESSING AS EXPECTED  Goal: Will remain free from falls  Outcome: PROGRESSING AS EXPECTED     Problem: Infection  Goal: Will remain free from infection  Outcome: PROGRESSING AS EXPECTED     Problem: Pain Management  Goal: Pain level will decrease to patient's comfort goal  Outcome: PROGRESSING AS EXPECTED     Problem: Psychosocial Needs:  Goal: Level of anxiety will decrease  Outcome: PROGRESSING AS EXPECTED

## 2019-08-04 NOTE — ASSESSMENT & PLAN NOTE
Was placed on Sulfasalazine by GI consultants recently, patient was also placed on flagyl recently, however still having diarrhea/pain/blood in stool  Continue IV Ceftriaxone/Metronidazole to prevent secondary infectious colitis  Continue IV solumedrol, she has had improvement but still w cramping/diarrhea  CDIFF negative  GI consulted, per ERP, they said hydration, IV solumedrol and if she does not respond to contact them for consult  She has improved so no inpatient consult needed at this time

## 2019-08-04 NOTE — PROGRESS NOTES
Pt sitting up in bed eating lunch. Pt denies any pain at this time. Pt stated all needs were met. Bed low/locked position, call light within reach, will continue to monitor.

## 2019-08-04 NOTE — PROGRESS NOTES
2 RN skin check complete with NAWAF Bernardo.  Devices in place none.  Skin assessed under devices n/a.  Confirmed pressure ulcer found none.  New potential pressure ulcers noted on none.  Wound consult placed and reported n/a.  The following interventions in place pt encouraged on frequent turns and early ambulation.

## 2019-08-04 NOTE — H&P
Hospital Medicine History & Physical Note    Date of Service  8/3/2019    Primary Care Physician  No primary care provider on file.    Consultants  GI Consultants    Code Status  Full Code    Chief Complaint  Chief Complaint   Patient presents with   • Weakness     diarrhea for past 4 months - pt sees specialist for issue, family states that pt has not gotten out of be since yesterday or eaten.        History of Presenting Illness  Wili York is a very pleasant 53 y.o. female with a past medical history of recently diagnosed Ulcerative colitis, was recently seen by Dr. Piper, underwent colonoscopy and was placed on oral Flagyl as well as prednisone taper, however patient presented to the emergency room on 8/3/2019 evaluation of continued diarrhea, malaise, and intractable nausea vomiting.  Patient says that she is tired and has been feeling this way for the past several weeks.  Patient says she has lost weight over the past several pounds.  She reports having blood in her diarrhea.  Unable to keep anything oral down including her antibiotics.      Review of Systems  Review of Systems   Constitutional: Positive for fever, malaise/fatigue and weight loss. Negative for chills.   HENT: Negative for congestion, hearing loss, sore throat and tinnitus.    Eyes: Negative for blurred vision, double vision, photophobia and pain.   Respiratory: Negative for cough, hemoptysis, sputum production, shortness of breath and stridor.    Cardiovascular: Negative for chest pain, palpitations, orthopnea, claudication and PND.   Gastrointestinal: Positive for abdominal pain, blood in stool, diarrhea, nausea and vomiting. Negative for constipation, heartburn and melena.   Genitourinary: Negative for dysuria, frequency and urgency.   Musculoskeletal: Negative for back pain, myalgias and neck pain.   Neurological: Positive for weakness. Negative for dizziness, tingling, tremors, sensory change, speech change and headaches.    Psychiatric/Behavioral: Negative for depression, memory loss and suicidal ideas. The patient is not nervous/anxious.    All other systems reviewed and are negative.      Past Medical History  Ulcerative Colitis     Surgical History  Patient denies prior surgeries    Family History  Denies significant past family history.    Social History   reports that she has never smoked. She has never used smokeless tobacco. She reports that she does not drink alcohol or use drugs.    Allergies  No Known Allergies    Medications  Prior to Admission medications    Medication Sig Start Date End Date Taking? Authorizing Provider   acetaminophen (TYLENOL) 500 MG Tab Take 1,000 mg by mouth every 8 hours as needed.   Yes Physician Outpatient   sulfaSALAzine (AZULFIDINE) 500 MG Tab Take 1,000 mg by mouth 3 times a day.   Yes Physician Outpatient   predniSONE (DELTASONE) 10 MG Tab Take 5-40 mg by mouth See Admin Instructions. 40 mg for 14 days  30 mg for 14 days  20 mg for 14 days  10 mg for 14 days  5 mg for 14 days  Then STOP 7/31/19  Yes Physician Outpatient       Physical Exam  Temp:  [37.8 °C (100.1 °F)] 37.8 °C (100.1 °F)  Pulse:  [86-89] 88  Resp:  [16-18] 16  BP: ()/(51-55) 95/55  SpO2:  [92 %-97 %] 94 %  Physical Exam   Constitutional: She is oriented to person, place, and time. She appears well-developed and well-nourished. No distress.   Ill appearing   HENT:   Head: Normocephalic and atraumatic.   Mouth/Throat: No oropharyngeal exudate.   Mucous membranes dry   Eyes: Pupils are equal, round, and reactive to light. Conjunctivae are normal. Right eye exhibits no discharge. No scleral icterus.   Neck: Neck supple. No JVD present. No thyromegaly present.   Cardiovascular: Intact distal pulses.   No murmur heard.  Pulmonary/Chest: Effort normal and breath sounds normal. No stridor. No respiratory distress. She has no wheezes. She has no rales.   Abdominal: Soft. Bowel sounds are normal. She exhibits no distension. There  is no tenderness. There is no rebound.   Musculoskeletal: Normal range of motion. She exhibits no edema.   Neurological: She is alert and oriented to person, place, and time. No cranial nerve deficit.   Skin: Skin is warm. She is not diaphoretic. No erythema.   Psychiatric: Thought content normal.   anxious   Nursing note and vitals reviewed.      Laboratory:  Recent Labs     08/03/19  1650   WBC 9.5   RBC 3.94*   HEMOGLOBIN 10.7*   HEMATOCRIT 32.4*   MCV 82.2   MCH 27.2   MCHC 33.0*   RDW 38.4   PLATELETCT 310   MPV 9.5     Recent Labs     08/03/19  1650   SODIUM 129*   POTASSIUM 3.3*   CHLORIDE 95*   CO2 25   GLUCOSE 267*   BUN 7*   CREATININE 0.62   CALCIUM 8.1*     Recent Labs     08/03/19  1650   ALTSGPT 27   ASTSGOT 24   ALKPHOSPHAT 78   TBILIRUBIN 0.4   LIPASE 27   GLUCOSE 267*               Urinalysis:          Imaging:  DX-CHEST-LIMITED (1 VIEW)   Final Result      1.  Minimal linear atelectasis in the left lower lobe.      2.  Otherwise clear chest.          Assessment/Plan:  I anticipate this patient will require at least two midnights for appropriate medical management, necessitating inpatient admission.    * BRBPR (bright red blood per rectum)  Assessment & Plan  Hx of Ulcerative Colitis, was placed on Sulfasalazine by GI consultants recently, patient was also placed on flagyl recently, however still having diarrhea, abdominal pain for over a week.  IV Ceftriaxone/Metronidazole ordered  IV solumedrol started.  CDIFF pending.  GI consulted, per ERP, they said hydrate her, IV solumedrol if she does not respond to contact them for consult     Hyperglycemia  Assessment & Plan  Suspect 2/2 to steroids and sepsis  Will check a1c.      Hypokalemia  Assessment & Plan  Potassium supplementation ordered  Expect increase of 0.1 mEq/L per each 10 mEq given  Will recheck after supplementation  Lab Results   Component Value Date/Time    POTASSIUM 3.3 (L) 08/03/2019 04:50 PM   Recheck bmp in the am for  changes      Hyponatremia  Assessment & Plan  2/2 to dehydration  IV fluids started  Recheck bmp in the am      VTE prophylaxis: Prophylaxis: SCDs

## 2019-08-04 NOTE — PROGRESS NOTES
[facility] Medicine Daily Progress Note    Date of Service  8/4/2019    Chief Complaint  53 y.o. female admitted 8/3/2019 with AP, BRBPR    Hospital Course    Lovenox history of ulcerative colitis followed by GI on sulfasalazine, admitted for abdominal pain, diarrhea and blood in stool found to have an ulcerative colitis flare        Interval Problem Update  8/4: Pain and diarrhea have improved, glucose elevated A1c pending.  Sliding scale and Lantus added    Consultants/Specialty  GI- via phone on admission    Code Status  Full    Disposition  F/U symptoms in AM, home on DC, GI f/u    Review of Systems  Review of Systems   Constitutional: Positive for malaise/fatigue. Negative for chills and fever.   HENT: Negative for sore throat.    Respiratory: Negative for cough and shortness of breath.    Cardiovascular: Negative for chest pain and palpitations.   Gastrointestinal: Positive for abdominal pain, blood in stool and diarrhea. Negative for heartburn, nausea and vomiting.   Genitourinary: Negative for dysuria and frequency.   Musculoskeletal: Negative for back pain and myalgias.   Neurological: Negative for dizziness, focal weakness, weakness and headaches.   Psychiatric/Behavioral: Negative for depression and hallucinations.   All other systems reviewed and are negative.       Physical Exam  Temp:  [36.6 °C (97.8 °F)-37.4 °C (99.4 °F)] 36.8 °C (98.2 °F)  Pulse:  [72-97] 72  Resp:  [16-30] 16  BP: ()/(48-66) 97/57  SpO2:  [84 %-94 %] 92 %    Physical Exam   Constitutional: She appears well-developed and well-nourished. No distress.   HENT:   Head: Normocephalic.   Mouth/Throat: No oropharyngeal exudate.   Eyes: Pupils are equal, round, and reactive to light. No scleral icterus.   Neck: Normal range of motion. No JVD present. No thyromegaly present.   Cardiovascular: Normal rate and regular rhythm.   No murmur heard.  Pulmonary/Chest: Effort normal. No respiratory distress. She has no wheezes.   Abdominal: Soft.  Bowel sounds are normal. She exhibits no distension. There is tenderness. There is no rebound and no guarding.   Neurological: She is alert. No cranial nerve deficit.   Skin: Skin is warm. No rash noted. No erythema.   Psychiatric: She has a normal mood and affect. Her behavior is normal.       Fluids    Intake/Output Summary (Last 24 hours) at 8/4/2019 1621  Last data filed at 8/4/2019 1230  Gross per 24 hour   Intake 1280 ml   Output --   Net 1280 ml       Laboratory  Recent Labs     08/03/19  1650 08/04/19  0244   WBC 9.5 8.2   RBC 3.94* 3.60*   HEMOGLOBIN 10.7* 9.9*   HEMATOCRIT 32.4* 30.7*   MCV 82.2 85.3   MCH 27.2 27.5   MCHC 33.0* 32.2*   RDW 38.4 39.6   PLATELETCT 310 291   MPV 9.5 9.8     Recent Labs     08/03/19  1650 08/04/19  0244   SODIUM 129* 132*   POTASSIUM 3.3* 3.7   CHLORIDE 95* 100   CO2 25 24   GLUCOSE 267* 270*   BUN 7* <5*   CREATININE 0.62 0.56   CALCIUM 8.1* 8.0*                   Imaging  DX-CHEST-LIMITED (1 VIEW)   Final Result      1.  Minimal linear atelectasis in the left lower lobe.      2.  Otherwise clear chest.           Assessment/Plan  * Ulcerative colitis, acute (HCC)  Assessment & Plan  Was placed on Sulfasalazine by GI consultants recently, patient was also placed on flagyl recently, however still having diarrhea/pain/blood in stool  IV Ceftriaxone/Metronidazole  Continue IV solumedrol, she has had improvement  CDIFF pending.  GI consulted, per ERP, they said hydration, IV solumedrol and if she does not respond to contact them for consult  She has improved so likely will f/u as outpatient    Hyperglycemia  Assessment & Plan  Suspect 2/2 to steroids but sugars are >270  Add lantus in AM  Add sliding scale now  Check a1c.      Hypokalemia  Assessment & Plan  Improved  Repeat in AM    Hyponatremia  Assessment & Plan  2/2 to dehydration  Improving with IV fluids  Recheck bmp in the am       Total time:  40 minutes.  I spent greater than 50% of the time for patient care, counseling,  and coordination on this date, including unit/floor time, and face-to-face time with the patient as per interval events and assessment and plan above      VTE prophylaxis: SCDs

## 2019-08-04 NOTE — ASSESSMENT & PLAN NOTE
New diagnosis based on A1c of 8.1 (Cut -off is 6.5).    She has even higher sugars currently due to steroids.  Start metformin, she may have adequate control with this medication only on discharge and may not need insulin  Continue sliding scale for now while on steroids

## 2019-08-04 NOTE — ED PROVIDER NOTES
ED Provider Note    CHIEF COMPLAINT  Chief Complaint   Patient presents with   • Weakness     diarrhea for past 4 months - pt sees specialist for issue, family states that pt has not gotten out of be since yesterday or eaten.        HPI  Florina York is a 53 y.o. female who presents with abdominal pain.  The patient states she has not been doing well over the last several months.  She had a colonoscopy performed the middle of July and followed up with a gastroenterologist on 31 July.  The patient was diagnosed with ulcerative colitis.  She was placed on steroids, sulfasalazine, Flagyl, and azithromycin.  The patient states she continues to have diarrhea.  She is also noticed some blood in her stool.  She does have associated nausea and vomiting.  She has diffuse weakness.  She is also had some arthralgias in the knees as well as in the back.  The patient has not had any associated fevers.  She has had a significant weight loss over the last several months.    REVIEW OF SYSTEMS  See HPI for further details. All other systems are negative.     PAST MEDICAL HISTORY  No past medical history on file.    FAMILY HISTORY  [unfilled]    SOCIAL HISTORY  Social History     Socioeconomic History   • Marital status:      Spouse name: Not on file   • Number of children: Not on file   • Years of education: Not on file   • Highest education level: Not on file   Occupational History   • Not on file   Social Needs   • Financial resource strain: Not on file   • Food insecurity:     Worry: Not on file     Inability: Not on file   • Transportation needs:     Medical: Not on file     Non-medical: Not on file   Tobacco Use   • Smoking status: Never Smoker   • Smokeless tobacco: Never Used   Substance and Sexual Activity   • Alcohol use: No   • Drug use: No   • Sexual activity: Not on file   Lifestyle   • Physical activity:     Days per week: Not on file     Minutes per session: Not on file   • Stress: Not on file  "  Relationships   • Social connections:     Talks on phone: Not on file     Gets together: Not on file     Attends Rastafarian service: Not on file     Active member of club or organization: Not on file     Attends meetings of clubs or organizations: Not on file     Relationship status: Not on file   • Intimate partner violence:     Fear of current or ex partner: Not on file     Emotionally abused: Not on file     Physically abused: Not on file     Forced sexual activity: Not on file   Other Topics Concern   • Not on file   Social History Narrative   • Not on file       SURGICAL HISTORY  No past surgical history on file.    CURRENT MEDICATIONS  Home Medications     Reviewed by Donovan Chris (Pharmacy Tech) on 08/03/19 at 1642  Med List Status: Complete   Medication Last Dose Status   acetaminophen (TYLENOL) 500 MG Tab PRN Active   predniSONE (DELTASONE) 10 MG Tab 8/2/2019 Active   sulfaSALAzine (AZULFIDINE) 500 MG Tab 8/3/2019 Active                ALLERGIES  No Known Allergies    PHYSICAL EXAM  VITAL SIGNS: BP (!) 95/55   Pulse 88   Temp 37.8 °C (100.1 °F) (Temporal)   Resp 16   Ht 1.626 m (5' 4\")   Wt 68 kg (150 lb)   SpO2 94%   BMI 25.75 kg/m²       Constitutional: Ill in appearance  HENT: Normocephalic, Atraumatic, Bilateral external ears normal, Oropharynx dry, no oral exudates, Nose normal.   Eyes: PERRLA, EOMI, Conjunctiva normal, No discharge.   Neck: Normal range of motion, No tenderness, Supple, No stridor.   Lymphatic: No lymphadenopathy noted.   Cardiovascular: Normal heart rate, Normal rhythm, No murmurs, No rubs, No gallops.   Thorax & Lungs: Normal breath sounds, No respiratory distress, No wheezing, No chest tenderness.   Abdomen: Diffuse tenderness, no rebound, no guarding, no distention  Skin: Warm, Dry, No erythema, No rash.   Back: No tenderness, No CVA tenderness.   Extremities: Intact distal pulses, No edema, No tenderness, No cyanosis, No clubbing.   Musculoskeletal: Good range of " motion in all major joints. No tenderness to palpation or major deformities noted.   Neurologic: Alert & oriented x 3, Normal motor function, Normal sensory function, No focal deficits noted.   Psychiatric: Affect normal, Judgment normal, Mood normal.     RADIOLOGY/PROCEDURES  DX-CHEST-LIMITED (1 VIEW)   Final Result      1.  Minimal linear atelectasis in the left lower lobe.      2.  Otherwise clear chest.        Results for orders placed or performed during the hospital encounter of 08/03/19   CBC WITH DIFFERENTIAL   Result Value Ref Range    WBC 9.5 4.8 - 10.8 K/uL    RBC 3.94 (L) 4.20 - 5.40 M/uL    Hemoglobin 10.7 (L) 12.0 - 16.0 g/dL    Hematocrit 32.4 (L) 37.0 - 47.0 %    MCV 82.2 81.4 - 97.8 fL    MCH 27.2 27.0 - 33.0 pg    MCHC 33.0 (L) 33.6 - 35.0 g/dL    RDW 38.4 35.9 - 50.0 fL    Platelet Count 310 164 - 446 K/uL    MPV 9.5 9.0 - 12.9 fL    Neutrophils-Polys 78.20 (H) 44.00 - 72.00 %    Lymphocytes 12.90 (L) 22.00 - 41.00 %    Monocytes 6.80 0.00 - 13.40 %    Eosinophils 1.10 0.00 - 6.90 %    Basophils 0.50 0.00 - 1.80 %    Immature Granulocytes 0.50 0.00 - 0.90 %    Nucleated RBC 0.00 /100 WBC    Neutrophils (Absolute) 7.42 (H) 2.00 - 7.15 K/uL    Lymphs (Absolute) 1.22 1.00 - 4.80 K/uL    Monos (Absolute) 0.64 0.00 - 0.85 K/uL    Eos (Absolute) 0.10 0.00 - 0.51 K/uL    Baso (Absolute) 0.05 0.00 - 0.12 K/uL    Immature Granulocytes (abs) 0.05 0.00 - 0.11 K/uL    NRBC (Absolute) 0.00 K/uL   COMP METABOLIC PANEL   Result Value Ref Range    Sodium 129 (L) 135 - 145 mmol/L    Potassium 3.3 (L) 3.6 - 5.5 mmol/L    Chloride 95 (L) 96 - 112 mmol/L    Co2 25 20 - 33 mmol/L    Anion Gap 9.0 0.0 - 11.9    Glucose 267 (H) 65 - 99 mg/dL    Bun 7 (L) 8 - 22 mg/dL    Creatinine 0.62 0.50 - 1.40 mg/dL    Calcium 8.1 (L) 8.4 - 10.2 mg/dL    AST(SGOT) 24 12 - 45 U/L    ALT(SGPT) 27 2 - 50 U/L    Alkaline Phosphatase 78 30 - 99 U/L    Total Bilirubin 0.4 0.1 - 1.5 mg/dL    Albumin 2.6 (L) 3.2 - 4.9 g/dL    Total Protein  6.7 6.0 - 8.2 g/dL    Globulin 4.1 (H) 1.9 - 3.5 g/dL    A-G Ratio 0.6 g/dL   LIPASE   Result Value Ref Range    Lipase 27 7 - 58 U/L   TROPONIN   Result Value Ref Range    Troponin T 28 (H) 6 - 19 ng/L   ESTIMATED GFR   Result Value Ref Range    GFR If African American >60 >60 mL/min/1.73 m 2    GFR If Non African American >60 >60 mL/min/1.73 m 2   EKG   Result Value Ref Range    Report       [facility] Emergency Dept.    Test Date:  2019  Pt Name:    FATOUMATA ANAND        Department: [facility]  MRN:        [id number]                      Room:       -ROOM 6  Gender:     Female                       Technician: HRR  :        [id number]                   Requested By:GONZALO KEEN  Order #:    [id number]                    Reading MD:    Measurements  Intervals                                Axis  Rate:       90                           P:          41  RI:         144                          QRS:        -10  QRSD:       72                           T:          1  QT:         340  QTc:        416    Interpretive Statements  SINUS RHYTHM  CONSIDER ANTERIOR INFARCT  BORDERLINE T ABNORMALITIES, ANTERIOR LEADS  No previous ECG available for comparison           COURSE & MEDICAL DECISION MAKING  Pertinent Labs & Imaging studies reviewed. (See chart for details)  This a 53-year-old female who presents the emerge department with abdominal pain, diarrhea, and vomiting.  I did review her note from GI consultants and she was recently diagnosed with ulcerative colitis.  The patient at this time clinically appears dehydrated.  She will receive a liter of fluid intravenously.  She also has low-grade fevers and she is failing outpatient management.  Therefore the patient be admitted to the hospital for further GI consultation and work-up.  She will require intravenous hydration as she cannot keep fluids down and she also has diarrhea.  She is also at risk for C. difficile colitis due to  her recent antibiotics and I will order a C. difficile toxin to rule this out as a source.  Clinically her abdomen is nonsurgical and therefore an abscess would be unlikely but cannot be fully excluded.  The patient admitted to the hospital she is currently resting in stable condition.    FINAL IMPRESSION  1.  Ulcerative colitis  2.  Rule out C. difficile colitis  3.  Dehydration    Disposition  The patient will be admitted in stable condition         Electronically signed by: Quinn Coe, 8/3/2019 6:03 PM

## 2019-08-04 NOTE — CARE PLAN
Problem: Communication  Goal: The ability to communicate needs accurately and effectively will improve  Outcome: PROGRESSING AS EXPECTED  POC updated with pt, questions answered, pt given time to voice concerns, no additional questions at this time.   used for communication      Problem: Safety  Goal: Will remain free from injury  Outcome: PROGRESSING AS EXPECTED  Bed in low position, call light in reach, non slip socks on pt, yellow arm band on, proper signs on door.

## 2019-08-04 NOTE — PROGRESS NOTES
Report received from Lilly MCCRACKEN. Communicated with FLAQUITA Bob that stool sample is needed. Pt resting in bed. Bed in low, locked position, call bell within reach, will continue to monitor.

## 2019-08-04 NOTE — PROGRESS NOTES
Telemetry Strip     Strip printed: [id number]  Measurements from am strip were as follows:  Rhythm:sr  HR: 69  Measurements: .16/.08/.36       Telemetry Shift Summary:    Rhythm: sr  HR: 70-80s  Ectopy:           Normal Values  Rhythm SR  HR Range    Measurements 0.12-0.20 / 0.06-0.10  / 0.30-0.52

## 2019-08-04 NOTE — PROGRESS NOTES
number [id number] used to complete admission history and update POC with pt and family at bedside.  Pt understands need to call before exiting bed, and has no additional questions at this time.

## 2019-08-05 PROBLEM — E11.9 TYPE II DIABETES MELLITUS (HCC): Status: ACTIVE | Noted: 2019-08-03

## 2019-08-05 LAB
ALBUMIN SERPL BCP-MCNC: 2 G/DL (ref 3.2–4.9)
BASOPHILS # BLD AUTO: 0.2 % (ref 0–1.8)
BASOPHILS # BLD: 0.02 K/UL (ref 0–0.12)
BUN SERPL-MCNC: 7 MG/DL (ref 8–22)
C DIFF DNA SPEC QL NAA+PROBE: NEGATIVE
C DIFF TOX GENS STL QL NAA+PROBE: NEGATIVE
CALCIUM SERPL-MCNC: 8 MG/DL (ref 8.4–10.2)
CHLORIDE SERPL-SCNC: 106 MMOL/L (ref 96–112)
CO2 SERPL-SCNC: 24 MMOL/L (ref 20–33)
CREAT SERPL-MCNC: 0.54 MG/DL (ref 0.5–1.4)
EOSINOPHIL # BLD AUTO: 0 K/UL (ref 0–0.51)
EOSINOPHIL NFR BLD: 0 % (ref 0–6.9)
ERYTHROCYTE [DISTWIDTH] IN BLOOD BY AUTOMATED COUNT: 39.4 FL (ref 35.9–50)
EST. AVERAGE GLUCOSE BLD GHB EST-MCNC: 186 MG/DL
GLUCOSE BLD-MCNC: 240 MG/DL (ref 65–99)
GLUCOSE BLD-MCNC: 255 MG/DL (ref 65–99)
GLUCOSE BLD-MCNC: 259 MG/DL (ref 65–99)
GLUCOSE SERPL-MCNC: 275 MG/DL (ref 65–99)
HBA1C MFR BLD: 8.1 % (ref 0–5.6)
HCT VFR BLD AUTO: 29.3 % (ref 37–47)
HGB BLD-MCNC: 9.2 G/DL (ref 12–16)
IMM GRANULOCYTES # BLD AUTO: 0.05 K/UL (ref 0–0.11)
IMM GRANULOCYTES NFR BLD AUTO: 0.6 % (ref 0–0.9)
LYMPHOCYTES # BLD AUTO: 1.03 K/UL (ref 1–4.8)
LYMPHOCYTES NFR BLD: 11.8 % (ref 22–41)
MCH RBC QN AUTO: 26.8 PG (ref 27–33)
MCHC RBC AUTO-ENTMCNC: 31.4 G/DL (ref 33.6–35)
MCV RBC AUTO: 85.4 FL (ref 81.4–97.8)
MONOCYTES # BLD AUTO: 0.39 K/UL (ref 0–0.85)
MONOCYTES NFR BLD AUTO: 4.5 % (ref 0–13.4)
NEUTROPHILS # BLD AUTO: 7.22 K/UL (ref 2–7.15)
NEUTROPHILS NFR BLD: 82.9 % (ref 44–72)
NRBC # BLD AUTO: 0 K/UL
NRBC BLD-RTO: 0 /100 WBC
PHOSPHATE SERPL-MCNC: 2.5 MG/DL (ref 2.5–4.5)
PLATELET # BLD AUTO: 268 K/UL (ref 164–446)
PMV BLD AUTO: 10.2 FL (ref 9–12.9)
POTASSIUM SERPL-SCNC: 3.8 MMOL/L (ref 3.6–5.5)
RBC # BLD AUTO: 3.43 M/UL (ref 4.2–5.4)
SODIUM SERPL-SCNC: 134 MMOL/L (ref 135–145)
WBC # BLD AUTO: 8.7 K/UL (ref 4.8–10.8)

## 2019-08-05 PROCEDURE — 700111 HCHG RX REV CODE 636 W/ 250 OVERRIDE (IP): Performed by: HOSPITALIST

## 2019-08-05 PROCEDURE — 80069 RENAL FUNCTION PANEL: CPT

## 2019-08-05 PROCEDURE — 700105 HCHG RX REV CODE 258: Performed by: HOSPITALIST

## 2019-08-05 PROCEDURE — 770020 HCHG ROOM/CARE - TELE (206)

## 2019-08-05 PROCEDURE — A9270 NON-COVERED ITEM OR SERVICE: HCPCS | Performed by: INTERNAL MEDICINE

## 2019-08-05 PROCEDURE — 82962 GLUCOSE BLOOD TEST: CPT | Mod: 91

## 2019-08-05 PROCEDURE — 87493 C DIFF AMPLIFIED PROBE: CPT

## 2019-08-05 PROCEDURE — 700102 HCHG RX REV CODE 250 W/ 637 OVERRIDE(OP): Performed by: HOSPITALIST

## 2019-08-05 PROCEDURE — A9270 NON-COVERED ITEM OR SERVICE: HCPCS | Performed by: HOSPITALIST

## 2019-08-05 PROCEDURE — 700101 HCHG RX REV CODE 250: Performed by: HOSPITALIST

## 2019-08-05 PROCEDURE — 700102 HCHG RX REV CODE 250 W/ 637 OVERRIDE(OP): Performed by: INTERNAL MEDICINE

## 2019-08-05 PROCEDURE — 99232 SBSQ HOSP IP/OBS MODERATE 35: CPT | Performed by: INTERNAL MEDICINE

## 2019-08-05 PROCEDURE — 700111 HCHG RX REV CODE 636 W/ 250 OVERRIDE (IP): Performed by: INTERNAL MEDICINE

## 2019-08-05 PROCEDURE — 700101 HCHG RX REV CODE 250: Performed by: FAMILY MEDICINE

## 2019-08-05 PROCEDURE — 85025 COMPLETE CBC W/AUTO DIFF WBC: CPT

## 2019-08-05 RX ORDER — PREDNISONE 10 MG/1
40 TABLET ORAL DAILY
Status: DISCONTINUED | OUTPATIENT
Start: 2019-08-05 | End: 2019-08-06 | Stop reason: HOSPADM

## 2019-08-05 RX ADMIN — METHYLPREDNISOLONE SODIUM SUCCINATE 62.5 MG: 125 INJECTION, POWDER, FOR SOLUTION INTRAMUSCULAR; INTRAVENOUS at 00:08

## 2019-08-05 RX ADMIN — PREDNISONE 40 MG: 10 TABLET ORAL at 09:06

## 2019-08-05 RX ADMIN — LIDOCAINE 1 PATCH: 50 PATCH TOPICAL at 20:20

## 2019-08-05 RX ADMIN — INSULIN GLARGINE 5 UNITS: 100 INJECTION, SOLUTION SUBCUTANEOUS at 06:41

## 2019-08-05 RX ADMIN — INSULIN HUMAN 3 UNITS: 100 INJECTION, SOLUTION PARENTERAL at 11:27

## 2019-08-05 RX ADMIN — SODIUM CHLORIDE: 9 INJECTION, SOLUTION INTRAVENOUS at 13:45

## 2019-08-05 RX ADMIN — SULFASALAZINE 1000 MG: 500 TABLET ORAL at 17:28

## 2019-08-05 RX ADMIN — METFORMIN HYDROCHLORIDE 1000 MG: 500 TABLET ORAL at 09:06

## 2019-08-05 RX ADMIN — METRONIDAZOLE 500 MG: 500 INJECTION, SOLUTION INTRAVENOUS at 06:30

## 2019-08-05 RX ADMIN — METFORMIN HYDROCHLORIDE 1000 MG: 500 TABLET ORAL at 17:27

## 2019-08-05 RX ADMIN — METRONIDAZOLE 500 MG: 500 INJECTION, SOLUTION INTRAVENOUS at 13:54

## 2019-08-05 RX ADMIN — SULFASALAZINE 1000 MG: 500 TABLET ORAL at 11:33

## 2019-08-05 RX ADMIN — CEFTRIAXONE SODIUM 2 G: 2 INJECTION, POWDER, FOR SOLUTION INTRAMUSCULAR; INTRAVENOUS at 17:29

## 2019-08-05 RX ADMIN — INSULIN HUMAN 3 UNITS: 100 INJECTION, SOLUTION PARENTERAL at 06:40

## 2019-08-05 RX ADMIN — INSULIN HUMAN 2 UNITS: 100 INJECTION, SOLUTION PARENTERAL at 17:19

## 2019-08-05 RX ADMIN — METRONIDAZOLE 500 MG: 500 INJECTION, SOLUTION INTRAVENOUS at 21:24

## 2019-08-05 RX ADMIN — SULFASALAZINE 1000 MG: 500 TABLET ORAL at 06:33

## 2019-08-05 RX ADMIN — METHYLPREDNISOLONE SODIUM SUCCINATE 62.5 MG: 125 INJECTION, POWDER, FOR SOLUTION INTRAMUSCULAR; INTRAVENOUS at 06:49

## 2019-08-05 ASSESSMENT — ENCOUNTER SYMPTOMS
FEVER: 0
BACK PAIN: 0
NAUSEA: 0
SHORTNESS OF BREATH: 0
EYE DISCHARGE: 0
WEAKNESS: 0
FOCAL WEAKNESS: 0
ROS GI COMMENTS: LOW APPETITE
BLOOD IN STOOL: 1
PND: 0
DEPRESSION: 0
SORE THROAT: 0
DIARRHEA: 1
HALLUCINATIONS: 0
EYE REDNESS: 0
MYALGIAS: 0
DIZZINESS: 0
VOMITING: 0
WHEEZING: 0
HEADACHES: 0
SPUTUM PRODUCTION: 0
CHILLS: 0
ABDOMINAL PAIN: 1
HEARTBURN: 0

## 2019-08-05 NOTE — PROGRESS NOTES
Pt sitting up in bed, family at bedside. Pt denies any pain at this time. Pt stated all needs were met. Bed low/locked position, call bell within reach, will continue to monitor

## 2019-08-05 NOTE — PROGRESS NOTES
Telemetry Shift Summary    Rhythm SR  HR Range 60s-80s  Ectopy No Ectopy  Measurements 0.16/0.08/0.36        Normal Values  Rhythm SR  HR Range    Measurements 0.12-0.20 / 0.06-0.10  / 0.30-0.52

## 2019-08-05 NOTE — CARE PLAN
Problem: Safety  Goal: Will remain free from injury  Outcome: PROGRESSING AS EXPECTED  Note:   Remind patient to use call light and provide assistance. Bed in low position, bed locked, and appropriate alarms set. Patient wearing non-slip socks. Call light and personal belongings are within reach.      Problem: Infection  Goal: Will remain free from infection  Outcome: PROGRESSING AS EXPECTED  Note:   Assess and monitor for signs and symptoms of infection. Perform hand hygiene before and after patient contact and entering/exiting the room. Educate patient and family on infection control, isolation, and hand hygiene.      Problem: Knowledge Deficit  Goal: Knowledge of disease process/condition, treatment plan, diagnostic tests, and medications will improve  Outcome: PROGRESSING AS EXPECTED  Note:   Encourage patient and family to ask questions and be involved in plan of care. Provide education on treatment plan, diagnostic testing, and medications; have patient and family verbalize understanding.

## 2019-08-05 NOTE — PROGRESS NOTES
Hospital Medicine Daily Progress Note    Date of Service  8/5/2019    Chief Complaint  53 y.o. female admitted 8/3/2019 with AP, BRBPR    Hospital Course    Lovenox history of ulcerative colitis followed by GI on sulfasalazine, admitted for abdominal pain, diarrhea and blood in stool found to have an ulcerative colitis flare        Interval Problem Update  8/4: Pain and diarrhea have improved, glucose elevated A1c pending.  Sliding scale and Lantus added  8/5: Still with pain and diarrhea, low PO intake.  A1c 8.1, metformin started.      Consultants/Specialty  GI- via phone on admission    Code Status  Full    Disposition  Home when stable, PO steroids and GI f/u    Review of Systems  Review of Systems   Constitutional: Positive for malaise/fatigue. Negative for chills and fever.   HENT: Negative for hearing loss, sore throat and tinnitus.    Eyes: Negative for discharge and redness.   Respiratory: Negative for sputum production, shortness of breath and wheezing.    Cardiovascular: Negative for leg swelling and PND.   Gastrointestinal: Positive for abdominal pain, blood in stool and diarrhea (Only twice lastnight/this AM). Negative for heartburn, nausea and vomiting.        Low appetite   Genitourinary: Negative for dysuria and frequency.   Musculoskeletal: Negative for back pain and myalgias.   Neurological: Negative for dizziness, focal weakness, weakness and headaches.   Psychiatric/Behavioral: Negative for depression and hallucinations.   All other systems reviewed and are negative.       Physical Exam  Temp:  [36.4 °C (97.6 °F)-37 °C (98.6 °F)] 36.6 °C (97.8 °F)  Pulse:  [67-77] 67  Resp:  [16-20] 18  BP: (108-124)/(52-62) 124/52  SpO2:  [90 %-95 %] 93 %    Physical Exam   Constitutional: She appears well-developed and well-nourished. No distress.   HENT:   Head: Normocephalic.   Mouth/Throat: No oropharyngeal exudate.   Eyes: Pupils are equal, round, and reactive to light. No scleral icterus.   Neck: Normal  range of motion. No JVD present. No thyromegaly present.   Cardiovascular: Normal rate and regular rhythm.   No murmur heard.  Pulmonary/Chest: Effort normal. No respiratory distress. She has no wheezes.   Abdominal: Soft. Bowel sounds are normal. She exhibits no distension. There is tenderness. There is no rebound and no guarding.   Musculoskeletal: Normal range of motion. She exhibits no edema.   Neurological: She is alert. No cranial nerve deficit.   Skin: Skin is warm. No rash noted. No erythema.   Psychiatric: She has a normal mood and affect. Her behavior is normal.       Fluids    Intake/Output Summary (Last 24 hours) at 8/5/2019 1511  Last data filed at 8/5/2019 0900  Gross per 24 hour   Intake 1690 ml   Output 25 ml   Net 1665 ml       Laboratory  Recent Labs     08/03/19 1650 08/04/19  0244 08/05/19  0445   WBC 9.5 8.2 8.7   RBC 3.94* 3.60* 3.43*   HEMOGLOBIN 10.7* 9.9* 9.2*   HEMATOCRIT 32.4* 30.7* 29.3*   MCV 82.2 85.3 85.4   MCH 27.2 27.5 26.8*   MCHC 33.0* 32.2* 31.4*   RDW 38.4 39.6 39.4   PLATELETCT 310 291 268   MPV 9.5 9.8 10.2     Recent Labs     08/03/19 1650 08/04/19  0244 08/05/19  0445   SODIUM 129* 132* 134*   POTASSIUM 3.3* 3.7 3.8   CHLORIDE 95* 100 106   CO2 25 24 24   GLUCOSE 267* 270* 275*   BUN 7* <5* 7*   CREATININE 0.62 0.56 0.54   CALCIUM 8.1* 8.0* 8.0*                   Imaging  DX-CHEST-LIMITED (1 VIEW)   Final Result      1.  Minimal linear atelectasis in the left lower lobe.      2.  Otherwise clear chest.           Assessment/Plan  * Ulcerative colitis, acute (HCC)  Assessment & Plan  Was placed on Sulfasalazine by GI consultants recently, patient was also placed on flagyl recently, however still having diarrhea/pain/blood in stool  Continue IV Ceftriaxone/Metronidazole to prevent secondary infectious colitis  Continue IV solumedrol, she has had improvement but still w cramping/diarrhea  CDIFF negative  GI consulted, per ERP, they said hydration, IV solumedrol and if she does  not respond to contact them for consult  She has improved so no inpatient consult needed at this time    Type II diabetes mellitus (HCC)  Assessment & Plan  New diagnosis based on A1c of 8.1 (Cut -off is 6.5).    She has even higher sugars currently due to steroids.  Start metformin, she may have adequate control with this medication only on discharge and may not need insulin  Continue sliding scale for now while on steroids    Hypokalemia  Assessment & Plan  Improved  Repeat in AM    Hyponatremia  Assessment & Plan  2/2 to dehydration  Improving with IV fluids  Recheck bmp in the am         VTE prophylaxis: SCDs

## 2019-08-05 NOTE — CARE PLAN
Problem: Communication  Goal: The ability to communicate needs accurately and effectively will improve  Outcome: PROGRESSING AS EXPECTED    Croatian speaking; utilized ipad interpretor; questions and concerns where answered effectively.        Problem: Venous Thromboembolism (VTW)/Deep Vein Thrombosis (DVT) Prevention:  Goal: Patient will participate in Venous Thrombosis (VTE)/Deep Vein Thrombosis (DVT)Prevention Measures  Outcome: PROGRESSING AS EXPECTED    Pt educated on DVT prevention. SCD's on pt.

## 2019-08-05 NOTE — PROGRESS NOTES
Telemetry Shift Summary    Rhythm SR  HR Range 66-78  Ectopy none  Measurements 0.14/0.08/0.34        Normal Values  Rhythm SR  HR Range    Measurements 0.12-0.20 / 0.06-0.10  / 0.30-0.52

## 2019-08-05 NOTE — PROGRESS NOTES
[facility] called asking if patients stool sample was sent. I called [facility] to verify if specimen was received. Lab verified that that it was received and sent to [facility] via .

## 2019-08-05 NOTE — PROGRESS NOTES
Received report from Daquan MCCRACKEN. Pt resting comfortably in bed. Safety precautions in place.

## 2019-08-06 ENCOUNTER — PATIENT OUTREACH (OUTPATIENT)
Dept: HEALTH INFORMATION MANAGEMENT | Facility: OTHER | Age: 54
End: 2019-08-06

## 2019-08-06 VITALS
HEIGHT: 64 IN | OXYGEN SATURATION: 94 % | HEART RATE: 77 BPM | RESPIRATION RATE: 18 BRPM | SYSTOLIC BLOOD PRESSURE: 126 MMHG | BODY MASS INDEX: 27.63 KG/M2 | DIASTOLIC BLOOD PRESSURE: 64 MMHG | TEMPERATURE: 98.3 F | WEIGHT: 161.82 LBS

## 2019-08-06 LAB
GLUCOSE BLD-MCNC: 240 MG/DL (ref 65–99)
GLUCOSE BLD-MCNC: 92 MG/DL (ref 65–99)

## 2019-08-06 PROCEDURE — 700105 HCHG RX REV CODE 258: Performed by: HOSPITALIST

## 2019-08-06 PROCEDURE — 700111 HCHG RX REV CODE 636 W/ 250 OVERRIDE (IP): Performed by: INTERNAL MEDICINE

## 2019-08-06 PROCEDURE — A9270 NON-COVERED ITEM OR SERVICE: HCPCS | Performed by: FAMILY MEDICINE

## 2019-08-06 PROCEDURE — 700102 HCHG RX REV CODE 250 W/ 637 OVERRIDE(OP): Performed by: INTERNAL MEDICINE

## 2019-08-06 PROCEDURE — 700101 HCHG RX REV CODE 250: Performed by: HOSPITALIST

## 2019-08-06 PROCEDURE — A9270 NON-COVERED ITEM OR SERVICE: HCPCS | Performed by: INTERNAL MEDICINE

## 2019-08-06 PROCEDURE — 99239 HOSP IP/OBS DSCHRG MGMT >30: CPT | Performed by: HOSPITALIST

## 2019-08-06 PROCEDURE — 700102 HCHG RX REV CODE 250 W/ 637 OVERRIDE(OP): Performed by: FAMILY MEDICINE

## 2019-08-06 PROCEDURE — 700102 HCHG RX REV CODE 250 W/ 637 OVERRIDE(OP): Performed by: HOSPITALIST

## 2019-08-06 PROCEDURE — A9270 NON-COVERED ITEM OR SERVICE: HCPCS | Performed by: HOSPITALIST

## 2019-08-06 PROCEDURE — 82962 GLUCOSE BLOOD TEST: CPT

## 2019-08-06 RX ORDER — CIPROFLOXACIN 500 MG/1
500 TABLET, FILM COATED ORAL 2 TIMES DAILY
Qty: 12 TAB | Refills: 0 | Status: SHIPPED | OUTPATIENT
Start: 2019-08-06 | End: 2019-08-12

## 2019-08-06 RX ADMIN — SODIUM CHLORIDE: 9 INJECTION, SOLUTION INTRAVENOUS at 00:10

## 2019-08-06 RX ADMIN — METRONIDAZOLE 500 MG: 500 INJECTION, SOLUTION INTRAVENOUS at 06:20

## 2019-08-06 RX ADMIN — TRAMADOL HYDROCHLORIDE 50 MG: 50 TABLET, FILM COATED ORAL at 08:20

## 2019-08-06 RX ADMIN — INSULIN HUMAN 2 UNITS: 100 INJECTION, SOLUTION PARENTERAL at 11:02

## 2019-08-06 RX ADMIN — SULFASALAZINE 1000 MG: 500 TABLET ORAL at 11:00

## 2019-08-06 RX ADMIN — SODIUM CHLORIDE: 9 INJECTION, SOLUTION INTRAVENOUS at 08:16

## 2019-08-06 RX ADMIN — SULFASALAZINE 1000 MG: 500 TABLET ORAL at 06:21

## 2019-08-06 RX ADMIN — PREDNISONE 40 MG: 10 TABLET ORAL at 06:20

## 2019-08-06 RX ADMIN — METFORMIN HYDROCHLORIDE 1000 MG: 500 TABLET ORAL at 08:16

## 2019-08-06 NOTE — DISCHARGE SUMMARY
Discharge Summary    CHIEF COMPLAINT ON ADMISSION  Chief Complaint   Patient presents with   • Weakness     diarrhea for past 4 months - pt sees specialist for issue, family states that pt has not gotten out of be since yesterday or eaten.        Reason for Admission  Near syncope     Admission Date  8/3/2019    CODE STATUS  Prior    HPI & HOSPITAL COURSE  This is a 53 y.o. female here with *weakness and diarrhea for 4-6 months. She has a known history of ulcerative colitis. She was admitted and treated with steroids, antibiotics and sulfasalazine for a possible UC flare. She was discussed with GI on admission who concurred with this management. She improved some through her hospital course, but remains quite anxious about her diagnosis. She is concerned about her frequent bowel motility and very anxious about her diagnosis in general. She will complete a course of oral meds at home and follow up with GI. I have attempted to get her into them sooner than her october appointment scheduled.          Therefore, she is discharged in good and stable condition to home with close outpatient follow-up.    The patient met 2-midnight criteria for an inpatient stay at the time of discharge.    Discharge Date  8/6/2019    FOLLOW UP ITEMS POST DISCHARGE  none    DISCHARGE DIAGNOSES  Principal Problem:    Ulcerative colitis, acute (HCC) POA: Unknown  Active Problems:    Hyponatremia POA: Unknown    Hypokalemia POA: Unknown    Type II diabetes mellitus (HCC) POA: Unknown  Resolved Problems:    * No resolved hospital problems. *      FOLLOW UP  No future appointments.  [address] [address] [phone]    Please call [facility] to schedule an appointment within two weeks of discharge.Thank you     Judd Ross M.D.  [address] [place] [id number] [phone]      Please have your primary care provider send a referral to establish care and set up an  appointment, within a month from your discharge.Thank you       MEDICATIONS ON DISCHARGE     Medication List      START taking these medications      Instructions   ciprofloxacin 500 MG Tabs  Commonly known as:  CIPRO   Take 1 Tab by mouth 2 times a day for 6 days.  Dose:  500 mg     metformin 1000 MG tablet  Commonly known as:  GLUCOPHAGE   Take 0.5 Tabs by mouth 2 times a day, with meals.  Dose:  500 mg        CONTINUE taking these medications      Instructions   acetaminophen 500 MG Tabs  Commonly known as:  TYLENOL   Take 1,000 mg by mouth every 8 hours as needed.  Dose:  1,000 mg     predniSONE 10 MG Tabs  Commonly known as:  DELTASONE   Take 5-40 mg by mouth See Admin Instructions. 40 mg for 14 days  30 mg for 14 days  20 mg for 14 days  10 mg for 14 days  5 mg for 14 days  Then STOP  Dose:  5-40 mg     sulfaSALAzine 500 MG Tabs  Commonly known as:  AZULFIDINE   Take 1,000 mg by mouth 3 times a day.  Dose:  1,000 mg            Allergies  No Known Allergies    DIET  No orders of the defined types were placed in this encounter.      ACTIVITY  As tolerated.  Weight bearing as tolerated    CONSULTATIONS  none    PROCEDURES  none    LABORATORY  Lab Results   Component Value Date    SODIUM 134 (L) 08/05/2019    POTASSIUM 3.8 08/05/2019    CHLORIDE 106 08/05/2019    CO2 24 08/05/2019    GLUCOSE 275 (H) 08/05/2019    BUN 7 (L) 08/05/2019    CREATININE 0.54 08/05/2019        Lab Results   Component Value Date    WBC 8.7 08/05/2019    HEMOGLOBIN 9.2 (L) 08/05/2019    HEMATOCRIT 29.3 (L) 08/05/2019    PLATELETCT 268 08/05/2019        Total time of the discharge process exceeds 35 minutes.

## 2019-08-06 NOTE — PROGRESS NOTES
Pt discharged to home. Discharge instructions provided to pt. Pt verbalizes understanding. Pt states all questions have been answered. Signed copy in chart. Prescriptions sent to [facility]. Pt states that all personal belongings are in possession.

## 2019-08-06 NOTE — PROGRESS NOTES
Telemetry Shift Summary    Rhythm SR  HR Range 60s-70s  Ectopy rPAC  Measurements 0.16/0.06/0.38        Normal Values  Rhythm SR  HR Range    Measurements 0.12-0.20 / 0.06-0.10  / 0.30-0.52

## 2019-08-06 NOTE — PROGRESS NOTES
Pt is A&Ox4, resting comfortably in bed. Assessment completed and pain level 10/10. Due medication have been given. Bed is in lowest postion, bed alarm is on, and side rails up x2, pt calls when needs assistance and call light within reach.    Pt is aware to show staff her next BM. Pt states the pain is in her right leg and abdomen area.

## 2019-08-06 NOTE — PROGRESS NOTES
Received bedside report from NAWAF Kelly and Johanny. Plan of care discussed. Safety precautions in place. Call light and personal belongings within reach. Patient resting comfortably in bed with eyes closed

## 2019-08-06 NOTE — DISCHARGE INSTRUCTIONS
Discharge Instructions per Gavino Ruiz M.D.    Please follow up with GI as scheduled.     DIET: regular    ACTIVITY: as tolerated    DIAGNOSIS: ulcerative colitis flare.     Return to ER if symptoms are progressively worse. Rectal bleeding.     Discharge Instructions    Discharged to home by car with relative. Discharged via wheelchair, hospital escort: Yes.  Special equipment needed: Not Applicable    Be sure to schedule a follow-up appointment with your primary care doctor or any specialists as instructed.     Discharge Plan:   Diet Plan: Discussed  Activity Level: Discussed  Confirmed Follow up Appointment: Appointment Scheduled  Confirmed Symptoms Management: Discussed  Medication Reconciliation Updated: Yes  Influenza Vaccine Indication: Patient Refuses    I understand that a diet low in cholesterol, fat, and sodium is recommended for good health. Unless I have been given specific instructions below for another diet, I accept this instruction as my diet prescription.   Other diet: regular    Special Instructions: None    · Is patient discharged on Warfarin / Coumadin?   No     Depression / Suicide Risk    As you are discharged from this [facility] facility, it is important to learn how to keep safe from harming yourself.    Recognize the warning signs:  · Abrupt changes in personality, positive or negative- including increase in energy   · Giving away possessions  · Change in eating patterns- significant weight changes-  positive or negative  · Change in sleeping patterns- unable to sleep or sleeping all the time   · Unwillingness or inability to communicate  · Depression  · Unusual sadness, discouragement and loneliness  · Talk of wanting to die  · Neglect of personal appearance   · Rebelliousness- reckless behavior  · Withdrawal from people/activities they love  · Confusion- inability to concentrate     If you or a loved one observes any of these behaviors or has concerns about self-harm, here's  what you can do:  · Talk about it- your feelings and reasons for harming yourself  · Remove any means that you might use to hurt yourself (examples: pills, rope, extension cords, firearm)  · Get professional help from the community (Mental Health, Substance Abuse, psychological counseling)  · Do not be alone:Call your Safe Contact- someone whom you trust who will be there for you.  · Call your local CRISIS HOTLINE 770-5469 or 107-382-2077  · Call your local Children's Mobile Crisis Response Team [place] (713) 514-6529 or wwwKnowrom  · Call the toll free National Suicide Prevention Hotlines   · National Suicide Prevention Lifeline 751-499-KYNP (1352)  · [employer] Line Network 800-SUICIDE (825-9010)          Colitis ulcerosa - Adultos  (Ulcerative Colitis, Adult)  La colitis ulcerosa es la hinchazón (inflamación) crónica del intestino grueso (colon). También pueden formarse llagas (úlceras) en el colon.  La colitis ulcerosa se relaciona mucho con otra afección llamada enfermedad de Crohn, caracterizada por la inflamación intestinal. En conjunto, se las conoce con frecuencia juana enfermedad intestinal inflamatoria (EII).  CAUSAS  La colitis ulcerosa se debe al aumento de actividad del sistema inmunitario en los intestinos. El sistema inmunitario protege al cuerpo contra bacterias, virus y hongos perjudiciales, y contra otras cosas que pueden causar enfermedades. Cuando el sistema inmunitario reacciona de forma excesiva, causa inflamación. Se desconoce la causa del aumento de actividad del sistema inmunitario.  FACTORES DE RIESGO  Entre los factores de riesgo de la colitis ulcerosa, se incluyen los siguientes:  · La edad. [place] incluye lo siguiente:  ¨ Tener entre 15 y 30 años.  ¨ Ser mayor de 60 años.  · Antecedentes familiares de colitis ulcerosa.  · Ascendencia judía.  SIGNOS Y SÍNTOMAS  Los síntomas frecuentes de la colitis ulcerosa incluyen sangrado rectal y diarrea. Existe matti amplia gerri de  síntomas, y los síntomas de matti persona dependen de la gravedad de la afección. Estos pueden ser algunos síntomas adicionales:  · Dolor o cólicos en el vientre (abdomen).  · Fiebre.  · Fatiga.  · Pérdida de peso.  · Sudoración nocturna.  · Dolor rectal.  · Sensación de necesitar defecar de inmediato.  · Náuseas.  · Pérdida del apetito.  · Anemia.  · Molestias o dolor en las articulaciones.  · Irritación en los ojos.  · Determinadas erupciones cutáneas.  DIAGNÓSTICO  La colitis ulcerosa puede diagnosticarse de la siguiente manera:  · Examen físico e historia clínica.  · Análisis de jessy y de heces.  · Radiografías.  · Tomografía computarizada.  · Colonoscopía. En esta prueba, se introduce un tubo flexible en el ano para examinar el colon.  · Examen de matti muestra de tejido del colon (biopsia).  TRATAMIENTO  El tratamiento de la colitis ulcerosa puede incluir medicamentos que se utilizan para lo siguiente:  · Disminuir la inflamación.  · Controlar el sistema inmunitario.  También puede ser necesaria matti cirugía.  INSTRUCCIONES PARA EL CUIDADO EN EL HOGAR  Medicamentos y vitaminas   · [place] los medicamentos solamente juana se lo haya indicado el médico. No tome aspirina.  · Pregúntele al médico si debe reg algún suplemento o vitamina.  Estilo de bakari   · [place] ejercicios regularmente.  · Limite el consumo de alcohol a no más de 1 medida por día si es sonia y no está embarazada, y 2 medidas si es hombre. Matti medida equivale a 12 onzas de cerveza, 5 onzas de vino o 1½ onzas de bebidas alcohólicas de gustavo graduación.  Comida y bebida   · [place] suficiente líquido para mantener la orina elliott o de color amarillo pálido.  · Consulte a srivastava médico sobre la dieta más conveniente para usted. Siga la dieta juana se lo haya indicado el médico. Casa de [place] puede incluir lo siguiente:  ¨ Evitar las bebidas con gas.  ¨ Evitar las palomitas de maíz, las pieles de las verduras, los emile secos y otros alimentos con alto contenido de fibra  cuando tenga síntomas de colitis ulcerosa.  ¨ [place] comidas más pequeñas con mayor frecuencia.  ¨ Llevar un registro de alimentos. [place] puede ayudarlo a hallar y evitar cualquier alimento que lo mercy sentir mal.  · Limitar el consumo de cafeína.  Instrucciones generales   · Concurra a todas las visitas de control, según le indique srivastava médico. [place] es importante.  SOLICITE ATENCIÓN MÉDICA SI:  · Los síntomas no mejoran o empeoran con el tratamiento.  · Sigue adelgazando.  · Tiene cólicos o heces flojas de forma sosa.  · Le aparece matti nueva erupción cutánea, úlceras en la piel o problemas en los ojos.  · Tiene fiebre o siente escalofríos.  SOLICITE ATENCIÓN MÉDICA DE INMEDIATO SI:  · Tiene diarrea con jessy.  · Siente un dolor intenso en el abdomen.  · Vomita.  Esta información no tiene juana fin reemplazar el consejo del médico. Asegúrese de hacerle al médico cualquier pregunta que tenga.  Document Released: 09/27/2006 Document Revised: 09/07/2016 Document Reviewed: 04/11/2016  [employer] Interactive Patient Education © 2017 [employer]

## 2019-08-06 NOTE — CARE PLAN
Problem: Safety  Goal: Will remain free from injury  Outcome: PROGRESSING AS EXPECTED  Note:   Remind patient to use call light and provide assistance. Bed in low position, bed locked, and appropriate alarms set. Patient wearing non-slip socks. Call light and personal belongings are within reach.       Problem: Psychosocial Needs:  Goal: Level of anxiety will decrease  Outcome: PROGRESSING AS EXPECTED  Note:   Encourage patient to verbalize concerns/needs. Provide active listening and assistance. Provide education on treatment, testing, and disease process.

## 2019-08-06 NOTE — PROGRESS NOTES
Received report from Daquan MCCRACKEN. Discussed plan of care and safety measures in place. No further needs at this time.

## 2019-08-06 NOTE — PROGRESS NOTES
Telemetry Shift Summary    Rhythm SR  HR Range 68-80  Ectopy none  Measurements 0.14/0.08/0.42        Normal Values  Rhythm SR  HR Range    Measurements 0.12-0.20 / 0.06-0.10  / 0.30-0.52

## 2019-08-06 NOTE — PROGRESS NOTES
"This AM patient is complaining of pain in left calf 10/10. \"I feel like the muscle is hard... like a ball\" and saying stomach still does not feel right... \"It's uncomfortable\" \"my intestines feel like they are moving\"    Patient states she is feeling \"drunk\". BP taken. 123/71 heart rate 72.  "

## 2019-08-09 LAB
BACTERIA BLD CULT: NORMAL
BACTERIA BLD CULT: NORMAL
SIGNIFICANT IND 70042: NORMAL
SIGNIFICANT IND 70042: NORMAL
SITE SITE: NORMAL
SITE SITE: NORMAL
SOURCE SOURCE: NORMAL
SOURCE SOURCE: NORMAL

## 2019-08-13 ENCOUNTER — HOSPITAL ENCOUNTER (EMERGENCY)
Facility: MEDICAL CENTER | Age: 54
End: 2019-08-14
Attending: EMERGENCY MEDICINE
Payer: COMMERCIAL

## 2019-08-13 DIAGNOSIS — R16.0 HEPATOMEGALY: ICD-10-CM

## 2019-08-13 DIAGNOSIS — R11.0 NAUSEA: ICD-10-CM

## 2019-08-13 DIAGNOSIS — K80.80 BILIARY CALCULUS OF OTHER SITE WITHOUT OBSTRUCTION: ICD-10-CM

## 2019-08-13 DIAGNOSIS — I30.9 PERICARDIAL EFFUSION, ACUTE: ICD-10-CM

## 2019-08-13 DIAGNOSIS — M54.9 ACUTE BACK PAIN, UNSPECIFIED BACK LOCATION, UNSPECIFIED BACK PAIN LATERALITY: ICD-10-CM

## 2019-08-13 PROCEDURE — 93005 ELECTROCARDIOGRAM TRACING: CPT | Performed by: EMERGENCY MEDICINE

## 2019-08-13 PROCEDURE — 99285 EMERGENCY DEPT VISIT HI MDM: CPT

## 2019-08-13 RX ORDER — CIPROFLOXACIN 500 MG/1
500 TABLET, FILM COATED ORAL 2 TIMES DAILY
COMMUNITY

## 2019-08-14 ENCOUNTER — PATIENT OUTREACH (OUTPATIENT)
Dept: HEALTH INFORMATION MANAGEMENT | Facility: OTHER | Age: 54
End: 2019-08-14

## 2019-08-14 ENCOUNTER — APPOINTMENT (OUTPATIENT)
Dept: RADIOLOGY | Facility: MEDICAL CENTER | Age: 54
End: 2019-08-14
Attending: EMERGENCY MEDICINE
Payer: COMMERCIAL

## 2019-08-14 VITALS
HEIGHT: 63 IN | WEIGHT: 149.25 LBS | DIASTOLIC BLOOD PRESSURE: 59 MMHG | HEART RATE: 85 BPM | RESPIRATION RATE: 19 BRPM | OXYGEN SATURATION: 95 % | BODY MASS INDEX: 26.45 KG/M2 | SYSTOLIC BLOOD PRESSURE: 111 MMHG | TEMPERATURE: 98.4 F

## 2019-08-14 LAB
ALBUMIN SERPL BCP-MCNC: 3.5 G/DL (ref 3.2–4.9)
ALBUMIN/GLOB SERPL: 1.1 G/DL
ALP SERPL-CCNC: 56 U/L (ref 30–99)
ALT SERPL-CCNC: 13 U/L (ref 2–50)
ANION GAP SERPL CALC-SCNC: 13 MMOL/L (ref 0–11.9)
APPEARANCE UR: CLEAR
AST SERPL-CCNC: 13 U/L (ref 12–45)
BASOPHILS # BLD AUTO: 0.4 % (ref 0–1.8)
BASOPHILS # BLD: 0.05 K/UL (ref 0–0.12)
BILIRUB SERPL-MCNC: 0.7 MG/DL (ref 0.1–1.5)
BILIRUB UR QL STRIP.AUTO: NEGATIVE
BUN SERPL-MCNC: 13 MG/DL (ref 8–22)
CALCIUM SERPL-MCNC: 8.5 MG/DL (ref 8.4–10.2)
CHLORIDE SERPL-SCNC: 98 MMOL/L (ref 96–112)
CO2 SERPL-SCNC: 23 MMOL/L (ref 20–33)
COLOR UR: YELLOW
CREAT SERPL-MCNC: 0.64 MG/DL (ref 0.5–1.4)
EKG IMPRESSION: NORMAL
EOSINOPHIL # BLD AUTO: 0.04 K/UL (ref 0–0.51)
EOSINOPHIL NFR BLD: 0.3 % (ref 0–6.9)
ERYTHROCYTE [DISTWIDTH] IN BLOOD BY AUTOMATED COUNT: 40.9 FL (ref 35.9–50)
GLOBULIN SER CALC-MCNC: 3.1 G/DL (ref 1.9–3.5)
GLUCOSE SERPL-MCNC: 179 MG/DL (ref 65–99)
GLUCOSE UR STRIP.AUTO-MCNC: NEGATIVE MG/DL
HCT VFR BLD AUTO: 35.6 % (ref 37–47)
HGB BLD-MCNC: 11.4 G/DL (ref 12–16)
IMM GRANULOCYTES # BLD AUTO: 0.08 K/UL (ref 0–0.11)
IMM GRANULOCYTES NFR BLD AUTO: 0.6 % (ref 0–0.9)
KETONES UR STRIP.AUTO-MCNC: ABNORMAL MG/DL
LACTATE BLD-SCNC: 2.3 MMOL/L (ref 0.5–2)
LEUKOCYTE ESTERASE UR QL STRIP.AUTO: NEGATIVE
LIPASE SERPL-CCNC: 36 U/L (ref 7–58)
LYMPHOCYTES # BLD AUTO: 2.77 K/UL (ref 1–4.8)
LYMPHOCYTES NFR BLD: 20.9 % (ref 22–41)
MCH RBC QN AUTO: 26.6 PG (ref 27–33)
MCHC RBC AUTO-ENTMCNC: 32 G/DL (ref 33.6–35)
MCV RBC AUTO: 83.2 FL (ref 81.4–97.8)
MICRO URNS: ABNORMAL
MONOCYTES # BLD AUTO: 0.94 K/UL (ref 0–0.85)
MONOCYTES NFR BLD AUTO: 7.1 % (ref 0–13.4)
NEUTROPHILS # BLD AUTO: 9.36 K/UL (ref 2–7.15)
NEUTROPHILS NFR BLD: 70.7 % (ref 44–72)
NITRITE UR QL STRIP.AUTO: NEGATIVE
NRBC # BLD AUTO: 0 K/UL
NRBC BLD-RTO: 0 /100 WBC
PH UR STRIP.AUTO: >=9 [PH] (ref 5–8)
PLATELET # BLD AUTO: 280 K/UL (ref 164–446)
PMV BLD AUTO: 9.2 FL (ref 9–12.9)
POTASSIUM SERPL-SCNC: 3.3 MMOL/L (ref 3.6–5.5)
PROT SERPL-MCNC: 6.6 G/DL (ref 6–8.2)
RBC # BLD AUTO: 4.28 M/UL (ref 4.2–5.4)
RBC UR QL AUTO: NEGATIVE
SODIUM SERPL-SCNC: 134 MMOL/L (ref 135–145)
SP GR UR STRIP.AUTO: 1.01
TROPONIN T SERPL-MCNC: 7 NG/L (ref 6–19)
WBC # BLD AUTO: 13.2 K/UL (ref 4.8–10.8)

## 2019-08-14 PROCEDURE — 83690 ASSAY OF LIPASE: CPT

## 2019-08-14 PROCEDURE — 81003 URINALYSIS AUTO W/O SCOPE: CPT

## 2019-08-14 PROCEDURE — 74175 CTA ABDOMEN W/CONTRAST: CPT

## 2019-08-14 PROCEDURE — 84484 ASSAY OF TROPONIN QUANT: CPT

## 2019-08-14 PROCEDURE — 71045 X-RAY EXAM CHEST 1 VIEW: CPT

## 2019-08-14 PROCEDURE — 96374 THER/PROPH/DIAG INJ IV PUSH: CPT

## 2019-08-14 PROCEDURE — 700117 HCHG RX CONTRAST REV CODE 255: Performed by: EMERGENCY MEDICINE

## 2019-08-14 PROCEDURE — 83605 ASSAY OF LACTIC ACID: CPT

## 2019-08-14 PROCEDURE — 700105 HCHG RX REV CODE 258: Performed by: EMERGENCY MEDICINE

## 2019-08-14 PROCEDURE — 85025 COMPLETE CBC W/AUTO DIFF WBC: CPT

## 2019-08-14 PROCEDURE — 80053 COMPREHEN METABOLIC PANEL: CPT

## 2019-08-14 PROCEDURE — 700111 HCHG RX REV CODE 636 W/ 250 OVERRIDE (IP): Performed by: EMERGENCY MEDICINE

## 2019-08-14 PROCEDURE — 96375 TX/PRO/DX INJ NEW DRUG ADDON: CPT

## 2019-08-14 RX ORDER — MORPHINE SULFATE 4 MG/ML
4 INJECTION, SOLUTION INTRAMUSCULAR; INTRAVENOUS ONCE
Status: COMPLETED | OUTPATIENT
Start: 2019-08-14 | End: 2019-08-14

## 2019-08-14 RX ORDER — ONDANSETRON 4 MG/1
4 TABLET, ORALLY DISINTEGRATING ORAL EVERY 8 HOURS PRN
Qty: 10 TAB | Refills: 0 | Status: SHIPPED | OUTPATIENT
Start: 2019-08-14 | End: 2019-08-14 | Stop reason: SDUPTHER

## 2019-08-14 RX ORDER — METOCLOPRAMIDE HYDROCHLORIDE 5 MG/ML
10 INJECTION INTRAMUSCULAR; INTRAVENOUS ONCE
Status: COMPLETED | OUTPATIENT
Start: 2019-08-14 | End: 2019-08-14

## 2019-08-14 RX ORDER — SODIUM CHLORIDE 9 MG/ML
1000 INJECTION, SOLUTION INTRAVENOUS ONCE
Status: COMPLETED | OUTPATIENT
Start: 2019-08-14 | End: 2019-08-14

## 2019-08-14 RX ORDER — ONDANSETRON 4 MG/1
4 TABLET, ORALLY DISINTEGRATING ORAL EVERY 8 HOURS PRN
Qty: 10 TAB | Refills: 0 | Status: SHIPPED | OUTPATIENT
Start: 2019-08-14 | End: 2019-08-19

## 2019-08-14 RX ORDER — ONDANSETRON 4 MG/1
4 TABLET, ORALLY DISINTEGRATING ORAL EVERY 8 HOURS PRN
Qty: 10 TAB | Refills: 0 | Status: SHIPPED | OUTPATIENT
Start: 2019-08-14 | End: 2019-08-14 | Stop reason: CLARIF

## 2019-08-14 RX ADMIN — MORPHINE SULFATE 4 MG: 4 INJECTION INTRAVENOUS at 00:31

## 2019-08-14 RX ADMIN — METOCLOPRAMIDE 10 MG: 5 INJECTION, SOLUTION INTRAMUSCULAR; INTRAVENOUS at 00:31

## 2019-08-14 RX ADMIN — IOHEXOL 100 ML: 350 INJECTION, SOLUTION INTRAVENOUS at 01:32

## 2019-08-14 RX ADMIN — SODIUM CHLORIDE 1000 ML: 9 INJECTION, SOLUTION INTRAVENOUS at 00:34

## 2019-08-14 ASSESSMENT — ENCOUNTER SYMPTOMS
FOCAL WEAKNESS: 0
WEAKNESS: 0
BACK PAIN: 1
RESPIRATORY NEGATIVE: 1
HEADACHES: 0
SEIZURES: 0
EYE PAIN: 0
CONSTITUTIONAL NEGATIVE: 1
EYES NEGATIVE: 1
FEVER: 0
FLANK PAIN: 0
BLOOD IN STOOL: 0
MYALGIAS: 0
SORE THROAT: 0
SHORTNESS OF BREATH: 0
ABDOMINAL PAIN: 0
NECK PAIN: 0
GASTROINTESTINAL NEGATIVE: 1
CARDIOVASCULAR NEGATIVE: 1
HALLUCINATIONS: 0
BRUISES/BLEEDS EASILY: 0

## 2019-08-14 NOTE — DISCHARGE INSTRUCTIONS
Today your CT scan shows:   1.  No visualized aneurysm or dissection.  2.  Hepatomegaly and diffuse hepatic steatosis.  3.  Cholelithiasis  4.  Small pericardial effusion  5.  Large quantity of stool in the colon, appearance compatible with constipation.

## 2019-08-14 NOTE — ED TRIAGE NOTES
"/56   Pulse 78   Temp 36.9 °C (98.4 °F) (Temporal)   Resp 19   Ht 1.6 m (5' 3\")   Wt 67.7 kg (149 lb 4 oz)   SpO2 99%   Breastfeeding? No   BMI 26.44 kg/m²     Chief Complaint   Patient presents with   • N/V     pt c/o increased n/v and back pain today, was seen for same symptoms about 10 days ago   • Back Pain       Pt ambulates to triage with stable gait, alert and oriented, regular unlabored respirations. Pt was told she has infection, finished Cipro yesterday. Pt states she was feeling better in last few days, but pain and nausea returned today.    "

## 2019-08-14 NOTE — ED PROVIDER NOTES
ED Provider Note    CHIEF COMPLAINT  Chief Complaint   Patient presents with   • N/V     pt c/o increased n/v and back pain today, was seen for same symptoms about 10 days ago   • Back Pain       HPI  HPI     53 y.o. F with past medical history of ulcerative colitis presents to the emergency department with chief complaint of nausea and back pain.    Patient states that throughout her recent last few months she has had similar episodes of upper back pain and nausea.  Patient denies any syncope or shortness of breath.  Patient denies any chest pain.  Patient denies any vomiting.  Patient states that she was hospitalized for this recently and since discharge from hospital she has had improvement in diarrhea and abdominal pain.    Patient describes back pain is located between her upper shoulder blades.  She describes it as achy and constant and nonradiating.    Patient denies any recent change of medication besides finishing ciprofloxacin..  Patient denies any melena or hematochezia.  Patient denies any headaches or weakness or numbness.    REVIEW OF SYSTEMS  Review of Systems   Constitutional: Negative.  Negative for fever.   HENT: Negative.  Negative for ear pain and sore throat.    Eyes: Negative.  Negative for pain.   Respiratory: Negative.  Negative for shortness of breath.    Cardiovascular: Negative.  Negative for chest pain.   Gastrointestinal: Negative.  Negative for abdominal pain and blood in stool.   Genitourinary: Negative for dysuria and flank pain.   Musculoskeletal: Positive for back pain. Negative for myalgias and neck pain.   Skin: Negative.  Negative for rash.   Neurological: Negative for focal weakness, seizures, weakness and headaches.   Endo/Heme/Allergies: Does not bruise/bleed easily.   Psychiatric/Behavioral: Negative for hallucinations and suicidal ideas.   All other systems reviewed and are negative.      PAST MEDICAL HISTORY   has a past medical history of Back pain.    SOCIAL  "HISTORY  Social History     Tobacco Use   • Smoking status: Never Smoker   • Smokeless tobacco: Never Used   Substance and Sexual Activity   • Alcohol use: No     Frequency: Never     Drinks per session: Patient refused     Binge frequency: Never   • Drug use: No   • Sexual activity: Not on file       SURGICAL HISTORY  patient denies any surgical history    CURRENT MEDICATIONS  Home Medications     Reviewed by Rosana Aguilera R.N. (Registered Nurse) on 08/13/19 at 2349  Med List Status: Partial   Medication Last Dose Status   acetaminophen (TYLENOL) 500 MG Tab  Active   ciprofloxacin (CIPRO) 500 MG Tab 8/12/2019 Active   metFORMIN (GLUCOPHAGE) 1000 MG tablet 8/12/2019 Active   predniSONE (DELTASONE) 10 MG Tab 8/12/2019 Active   sulfaSALAzine (AZULFIDINE) 500 MG Tab 8/12/2019 Active                ALLERGIES  No Known Allergies    PHYSICAL EXAM  VITAL SIGNS: /59   Pulse 85   Temp 36.9 °C (98.4 °F) (Temporal)   Resp 19   Ht 1.6 m (5' 3\")   Wt 67.7 kg (149 lb 4 oz)   SpO2 95%   Breastfeeding? No   BMI 26.44 kg/m²  @STEPHEN[[id number]::@  Pulse ox interpretation: I interpret this pulse ox as normal.    Physical Exam   Constitutional: She is oriented to person, place, and time and well-developed, well-nourished, and in no distress.   HENT:   Head: Normocephalic and atraumatic.   Right Ear: External ear normal.   Left Ear: External ear normal.   Eyes: Conjunctivae and EOM are normal. No scleral icterus.   Neck: Normal range of motion.   Cardiovascular: Normal rate.   Pulmonary/Chest: Effort normal. No stridor. No respiratory distress. She has no wheezes.   Abdominal: She exhibits no distension. There is no tenderness.   Musculoskeletal: Normal range of motion. She exhibits no edema or deformity.   Neurological: She is alert and oriented to person, place, and time. Coordination normal.   Skin: Skin is warm and dry. No rash noted. No erythema.   Psychiatric: Affect and judgment normal.       DIAGNOSTIC STUDIES / " PROCEDURES    LABS/EKG  Results for orders placed or performed during the hospital encounter of 08/13/19   CBC WITH DIFFERENTIAL   Result Value Ref Range    WBC 13.2 (H) 4.8 - 10.8 K/uL    RBC 4.28 4.20 - 5.40 M/uL    Hemoglobin 11.4 (L) 12.0 - 16.0 g/dL    Hematocrit 35.6 (L) 37.0 - 47.0 %    MCV 83.2 81.4 - 97.8 fL    MCH 26.6 (L) 27.0 - 33.0 pg    MCHC 32.0 (L) 33.6 - 35.0 g/dL    RDW 40.9 35.9 - 50.0 fL    Platelet Count 280 164 - 446 K/uL    MPV 9.2 9.0 - 12.9 fL    Neutrophils-Polys 70.70 44.00 - 72.00 %    Lymphocytes 20.90 (L) 22.00 - 41.00 %    Monocytes 7.10 0.00 - 13.40 %    Eosinophils 0.30 0.00 - 6.90 %    Basophils 0.40 0.00 - 1.80 %    Immature Granulocytes 0.60 0.00 - 0.90 %    Nucleated RBC 0.00 /100 WBC    Neutrophils (Absolute) 9.36 (H) 2.00 - 7.15 K/uL    Lymphs (Absolute) 2.77 1.00 - 4.80 K/uL    Monos (Absolute) 0.94 (H) 0.00 - 0.85 K/uL    Eos (Absolute) 0.04 0.00 - 0.51 K/uL    Baso (Absolute) 0.05 0.00 - 0.12 K/uL    Immature Granulocytes (abs) 0.08 0.00 - 0.11 K/uL    NRBC (Absolute) 0.00 K/uL   COMP METABOLIC PANEL   Result Value Ref Range    Sodium 134 (L) 135 - 145 mmol/L    Potassium 3.3 (L) 3.6 - 5.5 mmol/L    Chloride 98 96 - 112 mmol/L    Co2 23 20 - 33 mmol/L    Anion Gap 13.0 (H) 0.0 - 11.9    Glucose 179 (H) 65 - 99 mg/dL    Bun 13 8 - 22 mg/dL    Creatinine 0.64 0.50 - 1.40 mg/dL    Calcium 8.5 8.4 - 10.2 mg/dL    AST(SGOT) 13 12 - 45 U/L    ALT(SGPT) 13 2 - 50 U/L    Alkaline Phosphatase 56 30 - 99 U/L    Total Bilirubin 0.7 0.1 - 1.5 mg/dL    Albumin 3.5 3.2 - 4.9 g/dL    Total Protein 6.6 6.0 - 8.2 g/dL    Globulin 3.1 1.9 - 3.5 g/dL    A-G Ratio 1.1 g/dL   LIPASE   Result Value Ref Range    Lipase 36 7 - 58 U/L   TROPONIN   Result Value Ref Range    Troponin T 7 6 - 19 ng/L   LACTIC ACID   Result Value Ref Range    Lactic Acid 2.3 (H) 0.5 - 2.0 mmol/L   URINALYSIS (UA)   Result Value Ref Range    Color Yellow     Character Clear     Specific Gravity 1.015 <1.035    Ph >=9.0  (A) 5.0 - 8.0    Glucose Negative Negative mg/dL    Ketones Trace (A) Negative mg/dL    Bilirubin Negative Negative    Nitrite Negative Negative    Leukocyte Esterase Negative Negative    Occult Blood Negative Negative    Micro Urine Req see below    ESTIMATED GFR   Result Value Ref Range    GFR If African American >60 >60 mL/min/1.73 m 2    GFR If Non African American >60 >60 mL/min/1.73 m 2   EKG (NOW)   Result Value Ref Range    Report       [facility] Emergency Dept.    Test Date:  2019  Pt Name:    FATOUMATA ANAND        Department: [facility]  MRN:        [id number]                      Room:  Gender:     Female                       Technician:   :        [id number]                   Requested By:EMI THOMAS  Order #:    [id number]                    Reading MD:    Measurements  Intervals                                Axis  Rate:       71                           P:          50  GA:         142                          QRS:        10  QRSD:       79                           T:          30  QT:         371  QTc:        404    Interpretive Statements  Sinus rhythm  Low voltage, precordial leads  Compared to ECG 2019 17:12:24  Low QRS voltage now present  ST (T wave) deviation no longer present  T-wave abnormality no longer present  Possible ischemia no longer present       12 Lead EKG interpreted by me to show:  Normal sinus rhythm  Rate 54  Axis: Normal  Intervals: Normal  Normal T waves  Normal ST segments  My impression of this EKG: Low voltage precordial leads.  No STEMI    RADIOLOGY  CT-CTA COMPLETE THORACOABDOMINAL AORTA   Final Result         1.  No visualized aneurysm or dissection.   2.  Hepatomegaly and diffuse hepatic steatosis.   3.  Cholelithiasis   4.  Small pericardial effusion   5.  Large quantity of stool in the colon, appearance compatible with constipation.            DX-CHEST-PORTABLE (1 VIEW)   Final Result         1.  No acute cardiopulmonary  disease.           COURSE & MEDICAL DECISION MAKING  Pertinent Labs & Imaging studies reviewed by me. (See chart for details)    53 y.o. female [facility] UC  p/w back pain and nausea.     Differential diagnosis includes but is not limited to:    Upon arrival pt uncomfortable in appearance.  Therefore morphine and Reglan ordered.  Intravenous fluids administered for nausea and hx of diarrhea and anticipated contrast given.  Patient not appropriate for oral rehydration, as surgical process needs to be ruled out before trial of oral rehydration.   On repeat evaluation, pt w/ improvement in sx.  Pt w/ positive fluid response.   CTA of chest ordered given recent fluoroquinolone usage and back pain.  CTA w/ no e/o dissection.  No fx seen.   Small pericardial effusion visualized at this time.  Plan to follow-up with cardiology as outpatient and continue steroid as prescribed.  Hepatomegaly and cholelithiasis seen on CT scan.  Plan to follow-up as outpatient.  Patient with mild elevation in lactate and IVF given.    No e/o UTI  Mild elevation in glucose    Given sx plan for close f/u w/ PCP as outpt.   Significant improvement in sx prior to dc.  Pt tolerating PO and ambulates w/ steady gait          FINAL IMPRESSION  Visit Diagnoses     ICD-10-CM   1. Acute back pain, unspecified back location, unspecified back pain laterality M54.9   2. Nausea R11.0   3. Pericardial effusion, acute I30.9   4. Hepatomegaly R16.0   5. Biliary calculus of other site without obstruction K80.80              Electronically signed by: Tom Osman, 8/14/2019 12:03 AM

## 2019-09-27 ENCOUNTER — OFFICE VISIT (OUTPATIENT)
Dept: CARDIOLOGY | Facility: MEDICAL CENTER | Age: 54
End: 2019-09-27
Payer: COMMERCIAL

## 2019-09-27 VITALS
HEART RATE: 82 BPM | WEIGHT: 150 LBS | OXYGEN SATURATION: 93 % | HEIGHT: 65 IN | BODY MASS INDEX: 24.99 KG/M2 | SYSTOLIC BLOOD PRESSURE: 132 MMHG | DIASTOLIC BLOOD PRESSURE: 70 MMHG

## 2019-09-27 DIAGNOSIS — I31.39 PERICARDIAL EFFUSION: ICD-10-CM

## 2019-09-27 PROCEDURE — 99203 OFFICE O/P NEW LOW 30 MIN: CPT | Performed by: INTERNAL MEDICINE

## 2019-09-27 ASSESSMENT — ENCOUNTER SYMPTOMS
NERVOUS/ANXIOUS: 0
CHILLS: 0
DIZZINESS: 0
HEADACHES: 0
COUGH: 0
FEVER: 0
PALPITATIONS: 0
DIARRHEA: 1
SHORTNESS OF BREATH: 0
HEARTBURN: 0
NAUSEA: 0
EYE DISCHARGE: 0
DEPRESSION: 0
MYALGIAS: 0
PND: 0
BRUISES/BLEEDS EASILY: 0
BLURRED VISION: 0

## 2019-09-27 NOTE — PROGRESS NOTES
Chief Complaint   Patient presents with   • Chest Pressure       Subjective:   Florina York is a 54 y.o. female who presents today in follow-up because of an incidental small pericardial effusion noted on thoracoabdominal CAT scan mid August 2019.  She was in the emergency room several times in the last few months due to severe diarrhea.  Her ulcerative colitis symptoms are improved with current medication.  She reports no symptoms of chest discomfort or palpitations or shortness of breath.  No prior history of heart problems.  EKG which was reviewed from mid August 2019 is normal.    Past Medical History:   Diagnosis Date   • Back pain      History reviewed. No pertinent surgical history.  History reviewed. No pertinent family history.  Social History     Socioeconomic History   • Marital status:      Spouse name: Not on file   • Number of children: Not on file   • Years of education: Not on file   • Highest education level: Not on file   Occupational History   • Not on file   Social Needs   • Financial resource strain: Not on file   • Food insecurity:     Worry: Not on file     Inability: Not on file   • Transportation needs:     Medical: Not on file     Non-medical: Not on file   Tobacco Use   • Smoking status: Never Smoker   • Smokeless tobacco: Never Used   Substance and Sexual Activity   • Alcohol use: No     Frequency: Never     Drinks per session: Patient refused     Binge frequency: Never   • Drug use: No   • Sexual activity: Not on file   Lifestyle   • Physical activity:     Days per week: Not on file     Minutes per session: Not on file   • Stress: Not on file   Relationships   • Social connections:     Talks on phone: Not on file     Gets together: Not on file     Attends L[employer]-day service: Not on file     Active member of club or organization: Not on file     Attends meetings of clubs or organizations: Not on file     Relationship status: Not on file   • Intimate partner violence:  "    Fear of current or ex partner: Not on file     Emotionally abused: Not on file     Physically abused: Not on file     Forced sexual activity: Not on file   Other Topics Concern   • Not on file   Social History Narrative   • Not on file     No Known Allergies  Outpatient Encounter Medications as of 9/27/2019   Medication Sig Dispense Refill   • ciprofloxacin (CIPRO) 500 MG Tab Take 500 mg by mouth 2 times a day.     • metFORMIN (GLUCOPHAGE) 1000 MG tablet Take 0.5 Tabs by mouth 2 times a day, with meals. 60 Tab 3   • acetaminophen (TYLENOL) 500 MG Tab Take 1,000 mg by mouth every 8 hours as needed.     • sulfaSALAzine (AZULFIDINE) 500 MG Tab Take 1,000 mg by mouth 3 times a day.     • predniSONE (DELTASONE) 10 MG Tab Take 5-40 mg by mouth See Admin Instructions. 40 mg for 14 days  30 mg for 14 days  20 mg for 14 days  10 mg for 14 days  5 mg for 14 days  Then STOP       No facility-administered encounter medications on file as of 9/27/2019.      Review of Systems   Constitutional: Negative for chills, fever and malaise/fatigue.   Eyes: Negative for blurred vision and discharge.   Respiratory: Negative for cough and shortness of breath.    Cardiovascular: Negative for chest pain, palpitations, leg swelling and PND.   Gastrointestinal: Positive for diarrhea. Negative for heartburn and nausea.   Genitourinary: Negative for dysuria and urgency.   Musculoskeletal: Negative for myalgias.   Skin: Negative for itching and rash.   Neurological: Negative for dizziness and headaches.   Endo/Heme/Allergies: Negative for environmental allergies. Does not bruise/bleed easily.   Psychiatric/Behavioral: Negative for depression. The patient is not nervous/anxious.         Objective:   /70 (BP Location: Left arm, Patient Position: Sitting, BP Cuff Size: Adult)   Pulse 82   Ht 1.651 m (5' 5\")   Wt 68 kg (150 lb)   SpO2 93%   BMI 24.96 kg/m²     Physical Exam   Constitutional: She is oriented to person, place, and time. " She appears well-developed and well-nourished.   Neck: No JVD present.   Cardiovascular: Normal rate, regular rhythm and intact distal pulses. Exam reveals no gallop and no friction rub.   No murmur heard.  Pulmonary/Chest: Effort normal and breath sounds normal.   Abdominal: Soft. There is no tenderness.   Musculoskeletal: She exhibits no edema.   Neurological: She is alert and oriented to person, place, and time.   Skin: Skin is warm and dry.       Assessment:     1. Pericardial effusion  EC-ECHOCARDIOGRAM LTD W/O CONT       Medical Decision Making:  Today's Assessment / Status / Plan:   It is likely that the small pericardial effusion in mid August 2019 is physiologic.  I will get a limited echo and make sure that there is no increase in fluid.  Follow-up after the above test.

## 2019-09-27 NOTE — LETTER
[facility]   [address], [address]  [place] [address]  Phone: [phone]  Fax: [phone]              Florina York  1965    Encounter Date: 9/27/2019    Felix Howell M.D.          PROGRESS NOTE:  Chief Complaint   Patient presents with   • Chest Pressure       Subjective:   Florina York is a 54 y.o. female who presents today in follow-up because of an incidental small pericardial effusion noted on thoracoabdominal CAT scan mid August 2019.  She was in the emergency room several times in the last few months due to severe diarrhea.  Her ulcerative colitis symptoms are improved with current medication.  She reports no symptoms of chest discomfort or palpitations or shortness of breath.  No prior history of heart problems.  EKG which was reviewed from mid August 2019 is normal.    Past Medical History:   Diagnosis Date   • Back pain      History reviewed. No pertinent surgical history.  History reviewed. No pertinent family history.  Social History     Socioeconomic History   • Marital status:      Spouse name: Not on file   • Number of children: Not on file   • Years of education: Not on file   • Highest education level: Not on file   Occupational History   • Not on file   Social Needs   • Financial resource strain: Not on file   • Food insecurity:     Worry: Not on file     Inability: Not on file   • Transportation needs:     Medical: Not on file     Non-medical: Not on file   Tobacco Use   • Smoking status: Never Smoker   • Smokeless tobacco: Never Used   Substance and Sexual Activity   • Alcohol use: No     Frequency: Never     Drinks per session: Patient refused     Binge frequency: Never   • Drug use: No   • Sexual activity: Not on file   Lifestyle   • Physical activity:     Days per week: Not on file     Minutes per session: Not on file   • Stress: Not on file   Relationships   • Social connections:     Talks on  phone: Not on file     Gets together: Not on file     Attends Gnosticism service: Not on file     Active member of club or organization: Not on file     Attends meetings of clubs or organizations: Not on file     Relationship status: Not on file   • Intimate partner violence:     Fear of current or ex partner: Not on file     Emotionally abused: Not on file     Physically abused: Not on file     Forced sexual activity: Not on file   Other Topics Concern   • Not on file   Social History Narrative   • Not on file     No Known Allergies  Outpatient Encounter Medications as of 9/27/2019   Medication Sig Dispense Refill   • ciprofloxacin (CIPRO) 500 MG Tab Take 500 mg by mouth 2 times a day.     • metFORMIN (GLUCOPHAGE) 1000 MG tablet Take 0.5 Tabs by mouth 2 times a day, with meals. 60 Tab 3   • acetaminophen (TYLENOL) 500 MG Tab Take 1,000 mg by mouth every 8 hours as needed.     • sulfaSALAzine (AZULFIDINE) 500 MG Tab Take 1,000 mg by mouth 3 times a day.     • predniSONE (DELTASONE) 10 MG Tab Take 5-40 mg by mouth See Admin Instructions. 40 mg for 14 days  30 mg for 14 days  20 mg for 14 days  10 mg for 14 days  5 mg for 14 days  Then STOP       No facility-administered encounter medications on file as of 9/27/2019.      Review of Systems   Constitutional: Negative for chills, fever and malaise/fatigue.   Eyes: Negative for blurred vision and discharge.   Respiratory: Negative for cough and shortness of breath.    Cardiovascular: Negative for chest pain, palpitations, leg swelling and PND.   Gastrointestinal: Positive for diarrhea. Negative for heartburn and nausea.   Genitourinary: Negative for dysuria and urgency.   Musculoskeletal: Negative for myalgias.   Skin: Negative for itching and rash.   Neurological: Negative for dizziness and headaches.   Endo/Heme/Allergies: Negative for environmental allergies. Does not bruise/bleed easily.   Psychiatric/Behavioral: Negative for depression. The patient is not  "nervous/anxious.         Objective:   /70 (BP Location: Left arm, Patient Position: Sitting, BP Cuff Size: Adult)   Pulse 82   Ht 1.651 m (5' 5\")   Wt 68 kg (150 lb)   SpO2 93%   BMI 24.96 kg/m²      Physical Exam   Constitutional: She is oriented to person, place, and time. She appears well-developed and well-nourished.   Neck: No JVD present.   Cardiovascular: Normal rate, regular rhythm and intact distal pulses. Exam reveals no gallop and no friction rub.   No murmur heard.  Pulmonary/Chest: Effort normal and breath sounds normal.   Abdominal: Soft. There is no tenderness.   Musculoskeletal: She exhibits no edema.   Neurological: She is alert and oriented to person, place, and time.   Skin: Skin is warm and dry.       Assessment:     1. Pericardial effusion  EC-ECHOCARDIOGRAM LTD W/O CONT       Medical Decision Making:  Today's Assessment / Status / Plan:   It is likely that the small pericardial effusion in mid August 2019 is physiologic.  I will get a limited echo and make sure that there is no increase in fluid.  Follow-up after the above test.      HELLEN BrownC.  [address] [place] [address]  VIA Facsimile: [phone]                 "

## 2019-10-16 ENCOUNTER — HOSPITAL ENCOUNTER (OUTPATIENT)
Dept: CARDIOLOGY | Facility: MEDICAL CENTER | Age: 54
End: 2019-10-16
Attending: INTERNAL MEDICINE
Payer: COMMERCIAL

## 2019-10-16 DIAGNOSIS — I31.39 PERICARDIAL EFFUSION: ICD-10-CM

## 2019-10-16 LAB — LV EJECT FRACT  99904: 65

## 2019-10-16 PROCEDURE — 93308 TTE F-UP OR LMTD: CPT

## 2019-10-16 PROCEDURE — 93308 TTE F-UP OR LMTD: CPT | Mod: 26 | Performed by: INTERNAL MEDICINE

## 2019-10-30 ENCOUNTER — OFFICE VISIT (OUTPATIENT)
Dept: CARDIOLOGY | Facility: MEDICAL CENTER | Age: 54
End: 2019-10-30
Payer: COMMERCIAL

## 2019-10-30 VITALS
BODY MASS INDEX: 26.16 KG/M2 | WEIGHT: 157 LBS | OXYGEN SATURATION: 95 % | HEART RATE: 86 BPM | HEIGHT: 65 IN | DIASTOLIC BLOOD PRESSURE: 72 MMHG | SYSTOLIC BLOOD PRESSURE: 128 MMHG

## 2019-10-30 DIAGNOSIS — I31.39 PERICARDIAL EFFUSION: ICD-10-CM

## 2019-10-30 PROCEDURE — 99213 OFFICE O/P EST LOW 20 MIN: CPT | Performed by: NURSE PRACTITIONER

## 2019-10-30 RX ORDER — SULFASALAZINE 500 MG/1
TABLET, DELAYED RELEASE ORAL
Refills: 11 | COMMUNITY
Start: 2019-08-24 | End: 2019-10-30

## 2019-10-30 ASSESSMENT — ENCOUNTER SYMPTOMS
WEIGHT LOSS: 0
SHORTNESS OF BREATH: 0
DIZZINESS: 0
PND: 0
WEAKNESS: 0
CLAUDICATION: 0
PALPITATIONS: 0
ORTHOPNEA: 0

## 2019-10-30 NOTE — PROGRESS NOTES
Chief Complaint   Patient presents with   • Pericardial Effusion       Subjective:   Florina York is a 54 y.o. female who presents today regarding a small pericardial effusion that was incidentally discovered on thoracoabdominal CT scan in mid August.     Patient was seen for initial consultation by Dr. Felix Howell on 9/27/19. A repeat limited echocardiogram on 9/27/19 was ordered to re-evaluate.     Today patient states that she is feeling very well overall.  Denies any significant concerns or complaints to discuss today.    Past Medical History:   Diagnosis Date   • Back pain      History reviewed. No pertinent surgical history.  History reviewed. No pertinent family history.  Social History     Socioeconomic History   • Marital status:      Spouse name: Not on file   • Number of children: Not on file   • Years of education: Not on file   • Highest education level: Not on file   Occupational History   • Not on file   Social Needs   • Financial resource strain: Not on file   • Food insecurity:     Worry: Not on file     Inability: Not on file   • Transportation needs:     Medical: Not on file     Non-medical: Not on file   Tobacco Use   • Smoking status: Never Smoker   • Smokeless tobacco: Never Used   Substance and Sexual Activity   • Alcohol use: No     Frequency: Never     Drinks per session: Patient refused     Binge frequency: Never   • Drug use: No   • Sexual activity: Not on file   Lifestyle   • Physical activity:     Days per week: Not on file     Minutes per session: Not on file   • Stress: Not on file   Relationships   • Social connections:     Talks on phone: Not on file     Gets together: Not on file     Attends Protestant service: Not on file     Active member of club or organization: Not on file     Attends meetings of clubs or organizations: Not on file     Relationship status: Not on file   • Intimate partner violence:     Fear of current or ex partner: Not on file      "Emotionally abused: Not on file     Physically abused: Not on file     Forced sexual activity: Not on file   Other Topics Concern   • Not on file   Social History Narrative   • Not on file     No Known Allergies  Outpatient Encounter Medications as of 10/30/2019   Medication Sig Dispense Refill   • ciprofloxacin (CIPRO) 500 MG Tab Take 500 mg by mouth 2 times a day.     • metFORMIN (GLUCOPHAGE) 1000 MG tablet Take 0.5 Tabs by mouth 2 times a day, with meals. 60 Tab 3   • acetaminophen (TYLENOL) 500 MG Tab Take 1,000 mg by mouth every 8 hours as needed.     • sulfaSALAzine (AZULFIDINE) 500 MG Tab Take 1,000 mg by mouth 3 times a day.     • predniSONE (DELTASONE) 10 MG Tab Take 5-40 mg by mouth See Admin Instructions. 40 mg for 14 days  30 mg for 14 days  20 mg for 14 days  10 mg for 14 days  5 mg for 14 days  Then STOP     • [DISCONTINUED] sulfaSALAzine (AZULFIDINE EN-TAB) 500 MG EC tablet TAKE 2 TABLETS BY MOUTH THREE TIMES DAILY FOR 30 DAYS  11     No facility-administered encounter medications on file as of 10/30/2019.      Review of Systems   Constitutional: Negative for malaise/fatigue and weight loss.   Respiratory: Negative for shortness of breath.    Cardiovascular: Negative for chest pain, palpitations, orthopnea, claudication, leg swelling and PND.   Neurological: Negative for dizziness and weakness.   All other systems reviewed and are negative.       Objective:   /72 (BP Location: Left arm, Patient Position: Sitting, BP Cuff Size: Adult)   Pulse 86   Ht 1.651 m (5' 5\")   Wt 71.2 kg (157 lb)   SpO2 95%   BMI 26.13 kg/m²     Physical Exam   Constitutional: She is oriented to person, place, and time. She appears well-developed and well-nourished. No distress.   HENT:   Head: Normocephalic.   Eyes: EOM are normal.   Neck: No JVD present.   Cardiovascular: Normal rate, regular rhythm and normal heart sounds.   Pulmonary/Chest: Breath sounds normal. No respiratory distress.   Abdominal: Soft. There " is no tenderness.   Musculoskeletal: She exhibits no edema.   Neurological: She is alert and oriented to person, place, and time.   Skin: Skin is warm and dry.   Psychiatric: She has a normal mood and affect. Her behavior is normal.       Assessment:     1. Pericardial effusion         Medical Decision Making:  Today's Assessment / Status / Plan:   Pericardial effusion:  -Repeat limited TTE showed no evidence of pericardial effusion, normal LVEF and no significant valvular abnormalities.      Patient will follow-up with cardiology as needed.  Encourage patient to contact office should any questions or concerns arise.  Patient and daughter understand and agree with plan of care.    Collaborating Provider: Dr. Missy Coe       Please note that this dictation was created using voice recognition software. I have made every reasonable attempt to correct obvious errors, but I expect that there are errors of grammar and possibly content I did not discover before finalizing the note.